# Patient Record
Sex: FEMALE | Race: WHITE | NOT HISPANIC OR LATINO | Employment: OTHER | URBAN - METROPOLITAN AREA
[De-identification: names, ages, dates, MRNs, and addresses within clinical notes are randomized per-mention and may not be internally consistent; named-entity substitution may affect disease eponyms.]

---

## 2020-02-12 ENCOUNTER — HOSPITAL ENCOUNTER (INPATIENT)
Facility: HOSPITAL | Age: 60
LOS: 2 days | Discharge: HOME | End: 2020-02-14
Attending: INTERNAL MEDICINE | Admitting: HOSPITALIST

## 2020-02-12 ENCOUNTER — APPOINTMENT (OUTPATIENT)
Dept: GENERAL RADIOLOGY | Facility: HOSPITAL | Age: 60
End: 2020-02-12

## 2020-02-12 ENCOUNTER — ANESTHESIA EVENT (OUTPATIENT)
Dept: PERIOP | Facility: HOSPITAL | Age: 60
End: 2020-02-12

## 2020-02-12 ENCOUNTER — ANESTHESIA (OUTPATIENT)
Dept: PERIOP | Facility: HOSPITAL | Age: 60
End: 2020-02-12

## 2020-02-12 DIAGNOSIS — N20.1 URETEROLITHIASIS: Primary | ICD-10-CM

## 2020-02-12 PROBLEM — A41.9 SEPSIS WITHOUT ACUTE ORGAN DYSFUNCTION (HCC): Status: ACTIVE | Noted: 2020-02-12

## 2020-02-12 PROBLEM — E87.1 HYPONATREMIA: Status: ACTIVE | Noted: 2020-02-12

## 2020-02-12 PROBLEM — N10 ACUTE PYELONEPHRITIS: Status: ACTIVE | Noted: 2020-02-12

## 2020-02-12 PROBLEM — E11.9 TYPE 2 DIABETES MELLITUS WITHOUT COMPLICATION: Status: ACTIVE | Noted: 2020-02-12

## 2020-02-12 PROBLEM — N12 PYELONEPHRITIS: Status: ACTIVE | Noted: 2020-02-12

## 2020-02-12 LAB
ANION GAP SERPL CALCULATED.3IONS-SCNC: 12.2 MMOL/L (ref 5–15)
BASOPHILS # BLD AUTO: 0.02 10*3/MM3 (ref 0–0.2)
BASOPHILS NFR BLD AUTO: 0.2 % (ref 0–1.5)
BUN BLD-MCNC: 19 MG/DL (ref 6–20)
BUN/CREAT SERPL: 21.1 (ref 7–25)
CALCIUM SPEC-SCNC: 8.5 MG/DL (ref 8.6–10.5)
CHLORIDE SERPL-SCNC: 103 MMOL/L (ref 98–107)
CO2 SERPL-SCNC: 20.8 MMOL/L (ref 22–29)
CREAT BLD-MCNC: 0.9 MG/DL (ref 0.57–1)
D-LACTATE SERPL-SCNC: 0.8 MMOL/L (ref 0.5–2)
DEPRECATED RDW RBC AUTO: 36 FL (ref 37–54)
EOSINOPHIL # BLD AUTO: 0.06 10*3/MM3 (ref 0–0.4)
EOSINOPHIL NFR BLD AUTO: 0.6 % (ref 0.3–6.2)
ERYTHROCYTE [DISTWIDTH] IN BLOOD BY AUTOMATED COUNT: 13 % (ref 12.3–15.4)
GFR SERPL CREATININE-BSD FRML MDRD: 64 ML/MIN/1.73
GLUCOSE BLD-MCNC: 154 MG/DL (ref 65–99)
HBA1C MFR BLD: 9.5 % (ref 4.8–5.6)
HCT VFR BLD AUTO: 32.9 % (ref 34–46.6)
HGB BLD-MCNC: 10.7 G/DL (ref 12–15.9)
IMM GRANULOCYTES # BLD AUTO: 0.04 10*3/MM3 (ref 0–0.05)
IMM GRANULOCYTES NFR BLD AUTO: 0.4 % (ref 0–0.5)
LYMPHOCYTES # BLD AUTO: 0.74 10*3/MM3 (ref 0.7–3.1)
LYMPHOCYTES NFR BLD AUTO: 7.8 % (ref 19.6–45.3)
MCH RBC QN AUTO: 25.2 PG (ref 26.6–33)
MCHC RBC AUTO-ENTMCNC: 32.5 G/DL (ref 31.5–35.7)
MCV RBC AUTO: 77.6 FL (ref 79–97)
MONOCYTES # BLD AUTO: 1.47 10*3/MM3 (ref 0.1–0.9)
MONOCYTES NFR BLD AUTO: 15.5 % (ref 5–12)
NEUTROPHILS # BLD AUTO: 7.17 10*3/MM3 (ref 1.7–7)
NEUTROPHILS NFR BLD AUTO: 75.5 % (ref 42.7–76)
NRBC BLD AUTO-RTO: 0 /100 WBC (ref 0–0.2)
PLATELET # BLD AUTO: 226 10*3/MM3 (ref 140–450)
PMV BLD AUTO: 10.4 FL (ref 6–12)
POTASSIUM BLD-SCNC: 4 MMOL/L (ref 3.5–5.2)
RBC # BLD AUTO: 4.24 10*6/MM3 (ref 3.77–5.28)
SODIUM BLD-SCNC: 136 MMOL/L (ref 136–145)
WBC NRBC COR # BLD: 9.5 10*3/MM3 (ref 3.4–10.8)

## 2020-02-12 PROCEDURE — 85025 COMPLETE CBC W/AUTO DIFF WBC: CPT | Performed by: NURSE PRACTITIONER

## 2020-02-12 PROCEDURE — 25010000002 KETOROLAC TROMETHAMINE PER 15 MG: Performed by: HOSPITALIST

## 2020-02-12 PROCEDURE — 25010000002 PROPOFOL 10 MG/ML EMULSION: Performed by: ANESTHESIOLOGY

## 2020-02-12 PROCEDURE — 76000 FLUOROSCOPY <1 HR PHYS/QHP: CPT

## 2020-02-12 PROCEDURE — 25010000002 ONDANSETRON PER 1 MG: Performed by: ANESTHESIOLOGY

## 2020-02-12 PROCEDURE — 25010000002 MIDAZOLAM PER 1 MG: Performed by: ANESTHESIOLOGY

## 2020-02-12 PROCEDURE — 25010000002 FENTANYL CITRATE (PF) 100 MCG/2ML SOLUTION: Performed by: ANESTHESIOLOGY

## 2020-02-12 PROCEDURE — 25010000002 MORPHINE PER 10 MG: Performed by: INTERNAL MEDICINE

## 2020-02-12 PROCEDURE — C2617 STENT, NON-COR, TEM W/O DEL: HCPCS | Performed by: UROLOGY

## 2020-02-12 PROCEDURE — 25010000002 CEFTRIAXONE PER 250 MG: Performed by: NURSE PRACTITIONER

## 2020-02-12 PROCEDURE — 83605 ASSAY OF LACTIC ACID: CPT | Performed by: HOSPITALIST

## 2020-02-12 PROCEDURE — 80048 BASIC METABOLIC PNL TOTAL CA: CPT | Performed by: NURSE PRACTITIONER

## 2020-02-12 PROCEDURE — 87040 BLOOD CULTURE FOR BACTERIA: CPT | Performed by: HOSPITALIST

## 2020-02-12 PROCEDURE — 83036 HEMOGLOBIN GLYCOSYLATED A1C: CPT | Performed by: NURSE PRACTITIONER

## 2020-02-12 PROCEDURE — 74018 RADEX ABDOMEN 1 VIEW: CPT

## 2020-02-12 PROCEDURE — 0T778DZ DILATION OF LEFT URETER WITH INTRALUMINAL DEVICE, VIA NATURAL OR ARTIFICIAL OPENING ENDOSCOPIC: ICD-10-PCS | Performed by: UROLOGY

## 2020-02-12 PROCEDURE — C1769 GUIDE WIRE: HCPCS | Performed by: UROLOGY

## 2020-02-12 PROCEDURE — 25010000002 ONDANSETRON PER 1 MG: Performed by: NURSE PRACTITIONER

## 2020-02-12 DEVICE — URETERAL STENT
Type: IMPLANTABLE DEVICE | Site: URETER | Status: NON-FUNCTIONAL
Brand: CONTOUR™
Removed: 2020-02-25

## 2020-02-12 RX ORDER — POLYETHYLENE GLYCOL 3350 17 G/17G
17 POWDER, FOR SOLUTION ORAL DAILY
Status: DISCONTINUED | OUTPATIENT
Start: 2020-02-12 | End: 2020-02-14 | Stop reason: HOSPADM

## 2020-02-12 RX ORDER — LIDOCAINE HYDROCHLORIDE 20 MG/ML
INJECTION, SOLUTION INFILTRATION; PERINEURAL AS NEEDED
Status: DISCONTINUED | OUTPATIENT
Start: 2020-02-12 | End: 2020-02-12 | Stop reason: SURG

## 2020-02-12 RX ORDER — CEFTRIAXONE SODIUM 1 G/50ML
1 INJECTION, SOLUTION INTRAVENOUS EVERY 24 HOURS
Status: DISCONTINUED | OUTPATIENT
Start: 2020-02-12 | End: 2020-02-12

## 2020-02-12 RX ORDER — HYDRALAZINE HYDROCHLORIDE 20 MG/ML
5 INJECTION INTRAMUSCULAR; INTRAVENOUS
Status: DISCONTINUED | OUTPATIENT
Start: 2020-02-12 | End: 2020-02-12 | Stop reason: HOSPADM

## 2020-02-12 RX ORDER — DIPHENHYDRAMINE HYDROCHLORIDE 50 MG/ML
12.5 INJECTION INTRAMUSCULAR; INTRAVENOUS
Status: DISCONTINUED | OUTPATIENT
Start: 2020-02-12 | End: 2020-02-12 | Stop reason: HOSPADM

## 2020-02-12 RX ORDER — ONDANSETRON 2 MG/ML
4 INJECTION INTRAMUSCULAR; INTRAVENOUS EVERY 6 HOURS PRN
Status: DISCONTINUED | OUTPATIENT
Start: 2020-02-12 | End: 2020-02-14 | Stop reason: HOSPADM

## 2020-02-12 RX ORDER — SODIUM CHLORIDE 9 MG/ML
150 INJECTION, SOLUTION INTRAVENOUS CONTINUOUS
Status: DISCONTINUED | OUTPATIENT
Start: 2020-02-12 | End: 2020-02-12

## 2020-02-12 RX ORDER — HYDROCODONE BITARTRATE AND ACETAMINOPHEN 7.5; 325 MG/1; MG/1
1 TABLET ORAL EVERY 6 HOURS PRN
Status: DISCONTINUED | OUTPATIENT
Start: 2020-02-12 | End: 2020-02-14 | Stop reason: HOSPADM

## 2020-02-12 RX ORDER — PROMETHAZINE HYDROCHLORIDE 25 MG/1
25 SUPPOSITORY RECTAL ONCE AS NEEDED
Status: DISCONTINUED | OUTPATIENT
Start: 2020-02-12 | End: 2020-02-12 | Stop reason: HOSPADM

## 2020-02-12 RX ORDER — HYDROMORPHONE HYDROCHLORIDE 1 MG/ML
0.5 INJECTION, SOLUTION INTRAMUSCULAR; INTRAVENOUS; SUBCUTANEOUS
Status: DISCONTINUED | OUTPATIENT
Start: 2020-02-12 | End: 2020-02-12 | Stop reason: HOSPADM

## 2020-02-12 RX ORDER — MIDAZOLAM HYDROCHLORIDE 1 MG/ML
2 INJECTION INTRAMUSCULAR; INTRAVENOUS
Status: DISCONTINUED | OUTPATIENT
Start: 2020-02-12 | End: 2020-02-12 | Stop reason: HOSPADM

## 2020-02-12 RX ORDER — NALOXONE HCL 0.4 MG/ML
0.2 VIAL (ML) INJECTION AS NEEDED
Status: DISCONTINUED | OUTPATIENT
Start: 2020-02-12 | End: 2020-02-12 | Stop reason: HOSPADM

## 2020-02-12 RX ORDER — SODIUM CHLORIDE 0.9 % (FLUSH) 0.9 %
10 SYRINGE (ML) INJECTION EVERY 12 HOURS SCHEDULED
Status: DISCONTINUED | OUTPATIENT
Start: 2020-02-12 | End: 2020-02-13

## 2020-02-12 RX ORDER — CEFTRIAXONE SODIUM 2 G/50ML
2 INJECTION, SOLUTION INTRAVENOUS EVERY 24 HOURS
Status: DISCONTINUED | OUTPATIENT
Start: 2020-02-13 | End: 2020-02-14

## 2020-02-12 RX ORDER — KETOROLAC TROMETHAMINE 30 MG/ML
30 INJECTION, SOLUTION INTRAMUSCULAR; INTRAVENOUS EVERY 6 HOURS PRN
Status: DISCONTINUED | OUTPATIENT
Start: 2020-02-12 | End: 2020-02-13

## 2020-02-12 RX ORDER — DIPHENHYDRAMINE HCL 25 MG
25 CAPSULE ORAL
Status: DISCONTINUED | OUTPATIENT
Start: 2020-02-12 | End: 2020-02-12 | Stop reason: HOSPADM

## 2020-02-12 RX ORDER — EPHEDRINE SULFATE 50 MG/ML
5 INJECTION, SOLUTION INTRAVENOUS ONCE AS NEEDED
Status: DISCONTINUED | OUTPATIENT
Start: 2020-02-12 | End: 2020-02-12 | Stop reason: HOSPADM

## 2020-02-12 RX ORDER — FENTANYL CITRATE 50 UG/ML
50 INJECTION, SOLUTION INTRAMUSCULAR; INTRAVENOUS
Status: DISCONTINUED | OUTPATIENT
Start: 2020-02-12 | End: 2020-02-12 | Stop reason: HOSPADM

## 2020-02-12 RX ORDER — PROPOFOL 10 MG/ML
VIAL (ML) INTRAVENOUS AS NEEDED
Status: DISCONTINUED | OUTPATIENT
Start: 2020-02-12 | End: 2020-02-12 | Stop reason: SURG

## 2020-02-12 RX ORDER — MIDAZOLAM HYDROCHLORIDE 1 MG/ML
1 INJECTION INTRAMUSCULAR; INTRAVENOUS
Status: DISCONTINUED | OUTPATIENT
Start: 2020-02-12 | End: 2020-02-12 | Stop reason: HOSPADM

## 2020-02-12 RX ORDER — MAGNESIUM HYDROXIDE 1200 MG/15ML
LIQUID ORAL AS NEEDED
Status: DISCONTINUED | OUTPATIENT
Start: 2020-02-12 | End: 2020-02-12 | Stop reason: HOSPADM

## 2020-02-12 RX ORDER — PROMETHAZINE HYDROCHLORIDE 25 MG/ML
12.5 INJECTION, SOLUTION INTRAMUSCULAR; INTRAVENOUS ONCE AS NEEDED
Status: DISCONTINUED | OUTPATIENT
Start: 2020-02-12 | End: 2020-02-12 | Stop reason: HOSPADM

## 2020-02-12 RX ORDER — SODIUM CHLORIDE 0.9 % (FLUSH) 0.9 %
3 SYRINGE (ML) INJECTION EVERY 12 HOURS SCHEDULED
Status: DISCONTINUED | OUTPATIENT
Start: 2020-02-12 | End: 2020-02-12 | Stop reason: HOSPADM

## 2020-02-12 RX ORDER — ACETAMINOPHEN 325 MG/1
650 TABLET ORAL ONCE AS NEEDED
Status: DISCONTINUED | OUTPATIENT
Start: 2020-02-12 | End: 2020-02-12 | Stop reason: HOSPADM

## 2020-02-12 RX ORDER — HYDROCODONE BITARTRATE AND ACETAMINOPHEN 7.5; 325 MG/1; MG/1
1 TABLET ORAL ONCE AS NEEDED
Status: DISCONTINUED | OUTPATIENT
Start: 2020-02-12 | End: 2020-02-12 | Stop reason: HOSPADM

## 2020-02-12 RX ORDER — FAMOTIDINE 10 MG/ML
20 INJECTION, SOLUTION INTRAVENOUS ONCE
Status: COMPLETED | OUTPATIENT
Start: 2020-02-12 | End: 2020-02-12

## 2020-02-12 RX ORDER — SODIUM CHLORIDE 0.9 % (FLUSH) 0.9 %
10 SYRINGE (ML) INJECTION AS NEEDED
Status: DISCONTINUED | OUTPATIENT
Start: 2020-02-12 | End: 2020-02-13

## 2020-02-12 RX ORDER — PROMETHAZINE HYDROCHLORIDE 25 MG/1
25 TABLET ORAL ONCE AS NEEDED
Status: DISCONTINUED | OUTPATIENT
Start: 2020-02-12 | End: 2020-02-12 | Stop reason: HOSPADM

## 2020-02-12 RX ORDER — OXYCODONE AND ACETAMINOPHEN 7.5; 325 MG/1; MG/1
1 TABLET ORAL ONCE AS NEEDED
Status: DISCONTINUED | OUTPATIENT
Start: 2020-02-12 | End: 2020-02-12 | Stop reason: HOSPADM

## 2020-02-12 RX ORDER — ONDANSETRON 2 MG/ML
INJECTION INTRAMUSCULAR; INTRAVENOUS AS NEEDED
Status: DISCONTINUED | OUTPATIENT
Start: 2020-02-12 | End: 2020-02-12 | Stop reason: SURG

## 2020-02-12 RX ORDER — PROMETHAZINE HYDROCHLORIDE 25 MG/ML
6.25 INJECTION, SOLUTION INTRAMUSCULAR; INTRAVENOUS
Status: DISCONTINUED | OUTPATIENT
Start: 2020-02-12 | End: 2020-02-12 | Stop reason: HOSPADM

## 2020-02-12 RX ORDER — ONDANSETRON 2 MG/ML
4 INJECTION INTRAMUSCULAR; INTRAVENOUS ONCE AS NEEDED
Status: DISCONTINUED | OUTPATIENT
Start: 2020-02-12 | End: 2020-02-12 | Stop reason: HOSPADM

## 2020-02-12 RX ORDER — LABETALOL HYDROCHLORIDE 5 MG/ML
5 INJECTION, SOLUTION INTRAVENOUS
Status: DISCONTINUED | OUTPATIENT
Start: 2020-02-12 | End: 2020-02-12 | Stop reason: HOSPADM

## 2020-02-12 RX ORDER — LIDOCAINE HYDROCHLORIDE 10 MG/ML
0.5 INJECTION, SOLUTION EPIDURAL; INFILTRATION; INTRACAUDAL; PERINEURAL ONCE AS NEEDED
Status: DISCONTINUED | OUTPATIENT
Start: 2020-02-12 | End: 2020-02-12 | Stop reason: HOSPADM

## 2020-02-12 RX ORDER — SODIUM CHLORIDE 0.9 % (FLUSH) 0.9 %
3-10 SYRINGE (ML) INJECTION AS NEEDED
Status: DISCONTINUED | OUTPATIENT
Start: 2020-02-12 | End: 2020-02-12 | Stop reason: HOSPADM

## 2020-02-12 RX ORDER — ACETAMINOPHEN 325 MG/1
650 TABLET ORAL EVERY 4 HOURS PRN
Status: DISCONTINUED | OUTPATIENT
Start: 2020-02-12 | End: 2020-02-14 | Stop reason: HOSPADM

## 2020-02-12 RX ORDER — FLUMAZENIL 0.1 MG/ML
0.2 INJECTION INTRAVENOUS AS NEEDED
Status: DISCONTINUED | OUTPATIENT
Start: 2020-02-12 | End: 2020-02-12 | Stop reason: HOSPADM

## 2020-02-12 RX ORDER — SODIUM CHLORIDE, SODIUM LACTATE, POTASSIUM CHLORIDE, CALCIUM CHLORIDE 600; 310; 30; 20 MG/100ML; MG/100ML; MG/100ML; MG/100ML
9 INJECTION, SOLUTION INTRAVENOUS CONTINUOUS
Status: DISCONTINUED | OUTPATIENT
Start: 2020-02-12 | End: 2020-02-13

## 2020-02-12 RX ORDER — ACETAMINOPHEN 650 MG/1
650 SUPPOSITORY RECTAL EVERY 4 HOURS PRN
Status: DISCONTINUED | OUTPATIENT
Start: 2020-02-12 | End: 2020-02-12

## 2020-02-12 RX ORDER — MORPHINE SULFATE 2 MG/ML
2 INJECTION, SOLUTION INTRAMUSCULAR; INTRAVENOUS
Status: DISCONTINUED | OUTPATIENT
Start: 2020-02-12 | End: 2020-02-12

## 2020-02-12 RX ORDER — SODIUM CHLORIDE 9 MG/ML
150 INJECTION, SOLUTION INTRAVENOUS CONTINUOUS
Status: DISCONTINUED | OUTPATIENT
Start: 2020-02-12 | End: 2020-02-13

## 2020-02-12 RX ORDER — ACETAMINOPHEN 160 MG/5ML
650 SOLUTION ORAL EVERY 4 HOURS PRN
Status: DISCONTINUED | OUTPATIENT
Start: 2020-02-12 | End: 2020-02-12

## 2020-02-12 RX ADMIN — MORPHINE SULFATE 2 MG: 2 INJECTION, SOLUTION INTRAMUSCULAR; INTRAVENOUS at 02:24

## 2020-02-12 RX ADMIN — SODIUM CHLORIDE 150 ML/HR: 9 INJECTION, SOLUTION INTRAVENOUS at 20:03

## 2020-02-12 RX ADMIN — PROPOFOL 200 MG: 10 INJECTION, EMULSION INTRAVENOUS at 16:32

## 2020-02-12 RX ADMIN — SODIUM CHLORIDE 100 ML/HR: 9 INJECTION, SOLUTION INTRAVENOUS at 02:40

## 2020-02-12 RX ADMIN — SODIUM CHLORIDE, PRESERVATIVE FREE 10 ML: 5 INJECTION INTRAVENOUS at 20:03

## 2020-02-12 RX ADMIN — MORPHINE SULFATE 2 MG: 2 INJECTION, SOLUTION INTRAMUSCULAR; INTRAVENOUS at 06:47

## 2020-02-12 RX ADMIN — ONDANSETRON 4 MG: 2 INJECTION INTRAMUSCULAR; INTRAVENOUS at 02:27

## 2020-02-12 RX ADMIN — FENTANYL CITRATE 50 MCG: 50 INJECTION INTRAMUSCULAR; INTRAVENOUS at 16:10

## 2020-02-12 RX ADMIN — FAMOTIDINE 20 MG: 10 INJECTION INTRAVENOUS at 15:52

## 2020-02-12 RX ADMIN — SODIUM CHLORIDE, POTASSIUM CHLORIDE, SODIUM LACTATE AND CALCIUM CHLORIDE: 600; 310; 30; 20 INJECTION, SOLUTION INTRAVENOUS at 16:27

## 2020-02-12 RX ADMIN — MIDAZOLAM 1 MG: 1 INJECTION INTRAMUSCULAR; INTRAVENOUS at 16:09

## 2020-02-12 RX ADMIN — MIDAZOLAM 1 MG: 1 INJECTION INTRAMUSCULAR; INTRAVENOUS at 15:52

## 2020-02-12 RX ADMIN — KETOROLAC TROMETHAMINE 30 MG: 30 INJECTION, SOLUTION INTRAMUSCULAR at 11:43

## 2020-02-12 RX ADMIN — FENTANYL CITRATE 50 MCG: 50 INJECTION INTRAMUSCULAR; INTRAVENOUS at 15:52

## 2020-02-12 RX ADMIN — HYDROCODONE BITARTRATE AND ACETAMINOPHEN 1 TABLET: 7.5; 325 TABLET ORAL at 20:12

## 2020-02-12 RX ADMIN — ONDANSETRON HYDROCHLORIDE 4 MG: 2 SOLUTION INTRAMUSCULAR; INTRAVENOUS at 16:44

## 2020-02-12 RX ADMIN — ACETAMINOPHEN 650 MG: 325 TABLET, FILM COATED ORAL at 05:24

## 2020-02-12 RX ADMIN — CEFTRIAXONE SODIUM 1 G: 1 INJECTION, SOLUTION INTRAVENOUS at 03:46

## 2020-02-12 RX ADMIN — LIDOCAINE HYDROCHLORIDE 100 MG: 20 INJECTION, SOLUTION INFILTRATION; PERINEURAL at 16:31

## 2020-02-12 NOTE — PLAN OF CARE
Arrived to unit at 0130. Oriented to room, staff, schedule. Alert and oriented. Pleasant cooperative. Medicated with morphine x1 for c/o low abd pain with relief voiced. Medicated with Zofran x1 for c/o nausea, no vomiting, with good results. Temp elevated 101.5, tylenol given. IVF. IV abx. NPO. Urology consult ordered. Will monitor

## 2020-02-12 NOTE — ANESTHESIA PREPROCEDURE EVALUATION
Anesthesia Evaluation     NPO Solid Status: > 8 hours  NPO Liquid Status: > 8 hours           Airway   Mallampati: II  TM distance: >3 FB  Neck ROM: full  No difficulty expected  Dental    (+) implants    Pulmonary - normal exam   (+) a smoker Former,   Cardiovascular - normal exam        Neuro/Psych  GI/Hepatic/Renal/Endo    (+)   renal disease stones, diabetes mellitus type 2,     Musculoskeletal     Abdominal    Substance History      OB/GYN          Other                        Anesthesia Plan    ASA 3     general     intravenous induction     Anesthetic plan, all risks, benefits, and alternatives have been provided, discussed and informed consent has been obtained with: patient.

## 2020-02-12 NOTE — ANESTHESIA PROCEDURE NOTES
Airway  Urgency: elective    Date/Time: 2/12/2020 4:36 PM  Airway not difficult    General Information and Staff    Patient location during procedure: OR  Anesthesiologist: Missael Miller MD    Indications and Patient Condition  Indications for airway management: airway protection    Preoxygenated: yes  Mask difficulty assessment: 1 - vent by mask    Final Airway Details  Final airway type: supraglottic airway      Successful airway: LMA  Size 4    Number of attempts at approach: 1  Assessment: lips, teeth, and gum same as pre-op

## 2020-02-12 NOTE — PLAN OF CARE
Patient c/o pain and states toradol not helping. MD notified, no new orders. Gone to OR for stent placement, awaiting return to unit. Will monitor upon arrival.

## 2020-02-12 NOTE — SIGNIFICANT NOTE
Spoke with  and informed him that the pt has done well in recovery and she is still very sleepy.  She is ready to go back to her room P490 and he could go up to the room

## 2020-02-12 NOTE — CONSULTS
FIRST UROLOGY CONSULT      Patient Identification:  NAME:  Sherita Parada  Age:  60 y.o.   Sex:  female   :  1960   MRN:  [id number]       Chief complaint: L flank pain.    History of present illness:  H/p L flank pain , dull x 2 days. Fever. Ct scan at [facility] 4mm L ureteral stone, pyelo. Pain not controlled.        Past medical history:  No past medical history on file.    Past surgical history:  Past Surgical History:   Procedure Laterality Date   • APPENDECTOMY     • BACK SURGERY     • KNEE SURGERY         Allergies:  Patient has no known allergies.    Home medications:  No medications prior to admission.        Hospital medications:    [START ON 2020] cefTRIAXone 2 g Intravenous Q24H   polyethylene glycol 17 g Oral Daily   sodium chloride 10 mL Intravenous Q12H       sodium chloride 150 mL/hr Last Rate: 150 mL/hr (20 0759)     •  acetaminophen **OR** [DISCONTINUED] acetaminophen **OR** [DISCONTINUED] acetaminophen  •  ketorolac  •  ondansetron  •  sodium chloride    Family history:  Family History   Problem Relation Age of Onset   • Diabetes Mother    • Diabetes Father    • Heart disease Father    • COPD Father        Social history:  Social History     Tobacco Use   • Smoking status: Former Smoker     Packs/day: 1.50     Years: 30.00     Pack years: 45.00     Types: Cigarettes     Last attempt to quit: 2008     Years since quittin.0   • Smokeless tobacco: Never Used   Substance Use Topics   • Alcohol use: Never     Frequency: Never   • Drug use: Never       Review of systems:      Positive for:  Flank pain.    Negative for:  Confusion.      Objective:  TMax 24 hours:   Temp (24hrs), Av.8 °F (37.1 °C), Min:97.1 °F (36.2 °C), Max:101.5 °F (38.6 °C)      Vitals Ranges:   Temp:  [97.1 °F (36.2 °C)-101.5 °F (38.6 °C)] 97.1 °F (36.2 °C)  Heart Rate:  [] 80  Resp:  [16] 16  BP: (101-135)/(63-77) 101/63    Intake/Output Last 3 shifts:  I/O last 3 completed shifts:  In: 0   Out:  200 [Urine:200]     Physical Exam:    General Appearance:    Alert, cooperative, NAD   HEENT:    No trauma, pupils reactive, hearing intact   Back:     No CVA tenderness   Lungs:     Respirations unlabored, no wheezing    Heart:    RRR.   Abdomen:     Soft, NDNT, no masses, no guarding   :    Pelvic not performed, bladder non distended and non tender   Extremities:   No edema, no deformity   Lymphatic:     Skin:   No bleeding, bruising or rashes   Neuro/Psych:   Orientation intact, mood/affect pleasant, no focal findings       Results review:   I reviewed the patient's new clinical results.    Data review:  Lab Results (last 24 hours)     Procedure Component Value Units Date/Time    Basic Metabolic Panel [[id number]]  (Abnormal) Collected:  02/12/20 0728    Specimen:  Blood from Arm, Right Updated:  02/12/20 0812     Glucose 154 mg/dL      BUN 19 mg/dL      Creatinine 0.90 mg/dL      Sodium 136 mmol/L      Potassium 4.0 mmol/L      Chloride 103 mmol/L      CO2 20.8 mmol/L      Calcium 8.5 mg/dL      eGFR Non African Amer 64 mL/min/1.73      BUN/Creatinine Ratio 21.1     Anion Gap 12.2 mmol/L     Narrative:       GFR Normal >60  Chronic Kidney Disease <60  Kidney Failure <15      Hemoglobin A1c [[id number]]  (Abnormal) Collected:  02/12/20 0728    Specimen:  Blood from Arm, Right Updated:  02/12/20 0800     Hemoglobin A1C 9.50 %     Narrative:       Hemoglobin A1C Ranges:    Increased Risk for Diabetes  5.7% to 6.4%  Diabetes                     >= 6.5%  Diabetic Goal                < 7.0%    Lactic Acid, Plasma [[id number]]  (Normal) Collected:  02/12/20 0728    Specimen:  Blood from Arm, Right Updated:  02/12/20 0759     Lactate 0.8 mmol/L     CBC Auto Differential [[id number]]  (Abnormal) Collected:  02/12/20 0728    Specimen:  Blood from Arm, Right Updated:  02/12/20 0754     WBC 9.50 10*3/mm3      RBC 4.24 10*6/mm3      Hemoglobin 10.7 g/dL      Hematocrit 32.9 %      MCV 77.6 fL      MCH 25.2 pg      MCHC 32.5  g/dL      RDW 13.0 %      RDW-SD 36.0 fl      MPV 10.4 fL      Platelets 226 10*3/mm3      Neutrophil % 75.5 %      Lymphocyte % 7.8 %      Monocyte % 15.5 %      Eosinophil % 0.6 %      Basophil % 0.2 %      Immature Grans % 0.4 %      Neutrophils, Absolute 7.17 10*3/mm3      Lymphocytes, Absolute 0.74 10*3/mm3      Monocytes, Absolute 1.47 10*3/mm3      Eosinophils, Absolute 0.06 10*3/mm3      Basophils, Absolute 0.02 10*3/mm3      Immature Grans, Absolute 0.04 10*3/mm3      nRBC 0.0 /100 WBC     Blood Culture - Blood, Arm, Right [[id number]] Collected:  02/12/20 0728    Specimen:  Blood from Arm, Right Updated:  02/12/20 0742    Blood Culture - Blood, Hand, Right [[id number]] Collected:  02/12/20 0736    Specimen:  Blood from Hand, Right Updated:  02/12/20 0742           Imaging:  Imaging Results (Last 24 Hours)     ** No results found for the last 24 hours. **             Assessment:       Acute pyelonephritis    Ureterolithiasis    Type 2 diabetes mellitus without complication (CMS/HCC)    Sepsis without acute organ dysfunction (CMS/HCC)    Hyponatremia    L ureteral stone.  Pyelo.      Plan:     Plan cysto L stent placement.  R/NB d/w pt.      Chemo Ramsey MD  02/12/20  12:39 PM

## 2020-02-12 NOTE — H&P
Patient Name:  Sherita Parada  YOB: 1960  MRN:  [id number]  Admit Date:  2/12/2020  Patient Care Team:  Placido Ruiz MD as PCP - General (Family Medicine)      Subjective   History Present Illness     Chief complaint: Left flank and abdominal pain.    History of Present Illness     Mrs. Parada is a 60-year-old female with no real significant history other than  diabetes mellitus but she is not getting treatment for who presented to  emergency room for left flank pain and abdominal pain.  She states that she has not been feeling good for about a week, some off-and-on abdominal pain, but the abdominal pain and flank pain became severe on 2/11/2020 and is why she came to the emergency room.  She denies any fever or chills at home.  She denies any trouble urinating, although she states she did see some pink-tinged urine earlier in the day and she is had some persistent nausea and vomiting.  She denies any chest pain or shortness of breath or diarrhea.  She does complain of some constipation, has not had a bowel movement in about 5 days.  She does have history of kidney stones she states.  Patient had a CT abdomen and pelvis at  emergency room prior to transfer here to [facility],  which showed marked left perinephric and periureteral stranding with urothelial thickening concerning for pyelonephritis.  Marked left hydronephrosis and moderate hydroureter.  2 ureteral calculus present 1 measuring up to 4 mm which is positioned at approximately L4-5 intervertebral disc level.  Lab work from previous facility showed a sodium level 131, potassium 4.0, creatinine 1.15, GFR 48, calcium 9.1, white blood cell 13.9, hemoglobin 13.3.  Urine results positive for leukoesterase, nitrites, protein, ketones, bilirubin, blood, white blood cells, bacteria.  Culture is pending amiodarone appears.  Lactic was negative.  Patient appears in no acute distress.  She is resting well during my exam, pain is controlled  "at this time she does have some mild nausea.  Patient denies any significant history other than being told she has diabetes, but she is not taking medicine for that.  She did states she stopped  \"all\" her medicine because she was on too much, but she states she does not have any heart history, no lung history, no other significant history.  She was unsure of any other medications that she took, just that she is not taking any now.    Review of Systems   Constitutional: Positive for appetite change (decreased). Negative for fever.   HENT: Negative for nosebleeds and trouble swallowing.    Eyes: Negative for photophobia, redness and visual disturbance.   Respiratory: Negative for cough, chest tightness, shortness of breath and wheezing.    Cardiovascular: Negative for chest pain, palpitations and leg swelling.   Gastrointestinal: Positive for abdominal pain. Negative for abdominal distention, nausea and vomiting.   Endocrine: Negative.    Genitourinary: Positive for flank pain and hematuria. Negative for dysuria.   Musculoskeletal: Negative for gait problem and joint swelling.   Skin: Negative.    Neurological: Negative for dizziness, seizures, speech difficulty, light-headedness and headaches.   Hematological: Negative.    Psychiatric/Behavioral: Negative for behavioral problems and confusion.        Personal History     No past medical history on file.  Past Surgical History:   Procedure Laterality Date   • APPENDECTOMY     • BACK SURGERY     • KNEE SURGERY       Family History   Problem Relation Age of Onset   • Diabetes Mother    • Diabetes Father    • Heart disease Father    • COPD Father      Social History     Tobacco Use   • Smoking status: Former Smoker     Packs/day: 1.50     Years: 30.00     Pack years: 45.00     Types: Cigarettes     Last attempt to quit: 2008     Years since quittin.0   • Smokeless tobacco: Never Used   Substance Use Topics   • Alcohol use: Never     Frequency: Never   • Drug " use: Never     No current facility-administered medications on file prior to encounter.      No current outpatient medications on file prior to encounter.     No Known Allergies    Objective    Objective     Vital Signs  Temp:  [97.8 °F (36.6 °C)] 97.8 °F (36.6 °C)  Heart Rate:  [99] 99  Resp:  [16] 16  BP: (127)/(77) 127/77  SpO2:  [95 %] 95 %  on   ;   Device (Oxygen Therapy): room air  Body mass index is 24.59 kg/m².    Physical Exam   Constitutional: She is oriented to person, place, and time. She appears well-developed and well-nourished. No distress.   HENT:   Head: Normocephalic.   Eyes: EOM are normal.   Neck: Normal range of motion. No JVD present.   Cardiovascular: Normal rate and regular rhythm.   Pulmonary/Chest: Effort normal and breath sounds normal.   Abdominal: Soft. She exhibits no distension. There is tenderness in the suprapubic area and left lower quadrant. There is CVA tenderness (left).   Musculoskeletal: Normal range of motion.   Neurological: She is alert and oriented to person, place, and time. She has normal strength.   Skin: Skin is warm and dry. Capillary refill takes less than 2 seconds.   Psychiatric: Her behavior is normal.   Nursing note and vitals reviewed.      Results Review:  I reviewed the patient's new clinical results.  I reviewed the patient's new imaging results and agree with the interpretation.  I reviewed the patient's other test results and agree with the interpretation  I personally viewed and interpreted the patient's EKG/Telemetry data  Discussed with ED provider.    Lab Results (last 24 hours)     ** No results found for the last 24 hours. **          Imaging Results (Last 24 Hours)     ** No results found for the last 24 hours. **               No orders to display        Assessment/Plan     Active Hospital Problems    Diagnosis POA   • Pyelonephritis [N12] Unknown   • Ureterolithiasis [N20.1] Unknown   • Type 2 diabetes mellitus without complication (CMS/[facility])  [E11.9] Unknown     Mrs. Parada is a 60-year-old female with no real significant history other than some diabetes mellitus but she is not getting treatment for who presented to you emergency room for left flank pain and abdominal pain.      Pyelonephritis/ureterolithiasis  -Consult urology  -Patient given 1 g of Rocephin in the ED the emergency room, will continue 1 g Rocephin every 24 hours.  -Patient states she does have history of kidney stones, has seen Dr. Ramsey in the past.  -Pain and nausea control overnight  -IV fluids, n.p.o. until seen by urology    Type 2 diabetes  -Patient states she was told she has diabetes, but she is not taking any medication for that, she has not had A1c checked recently.    Patient states she is on no home meds because she stopped taking everything because she was on too many meds.  She denies any significant history other than being told she has diabetes.     I discussed the patient's findings and my recommendations with patient, nursing staff and ED provider.    VTE Prophylaxis - SCDs.  Code Status - Full code.       BALDO Randall  [facility]  02/12/20  3:08 AM

## 2020-02-12 NOTE — OP NOTE
PREOPERATIVE DIAGNOSIS: Left ureteral stone, pyelonephritis.    POSTOPERATIVE DIAGNOSIS: Same    PROCEDURE: Cystoscopy left ureteral stent placement.    SURGEON:  Chemo Ramsey MD    ASSISTANT: None    ANESTHESIA: General    EBL: None    DRAINS: 6 Korean by 26 cm double-J ureteral stent.    COMPLICATIONS: None    FINDINGS: Left ureteral stone.    INDICATIONS FOR PROCEDURE: History of a 4 mm obstructing left ureteral stone with pyelonephritis.  Patient presents today for cystoscopy left stent placement.  Risk and benefits were explained include but not limited to bleeding, infection, damage to kidney and ureter.  Patient consented to the procedure.    DESCRIPTION OF PROCEDURE: After receiving antibiotics was taken to the cystoscopy suite underwent a general anesthesia.  After adequate anesthesia was obtained she is placed in dorsal lithotomy position.  Her groins prepped and draped in sterile fashion.  A 21 Korean cystoscope was introduced into the urethra and into the bladder the urethra was in normal limits once into the bladder the ureteral orifice ease were noted bilaterally no masses or stones were noted.  I placed an 035 Bentson guidewire into the left orifice and into the left renal pelvis which was confirmed by fluoroscopy.  I then placed a 6 Korean by 26 cm double-J ureteral stent over the guidewire and Seldinger technique.  There was good coil noted in the left renal pelvis which was confirmed by fluoroscopy and good coil noted in the bladder confirmed by cystoscopy.  Bladder was drained string was removed cystoscope was removed.  She was extubated taken recovery in satisfactory condition.  She tolerated the procedure well.

## 2020-02-13 LAB
ALBUMIN SERPL-MCNC: 2.6 G/DL (ref 3.5–5.2)
ALBUMIN/GLOB SERPL: 0.7 G/DL
ALP SERPL-CCNC: 77 U/L (ref 39–117)
ALT SERPL W P-5'-P-CCNC: 7 U/L (ref 1–33)
ANION GAP SERPL CALCULATED.3IONS-SCNC: 13 MMOL/L (ref 5–15)
AST SERPL-CCNC: 10 U/L (ref 1–32)
BASOPHILS # BLD AUTO: 0.04 10*3/MM3 (ref 0–0.2)
BASOPHILS NFR BLD AUTO: 0.5 % (ref 0–1.5)
BILIRUB SERPL-MCNC: 0.3 MG/DL (ref 0.2–1.2)
BUN BLD-MCNC: 14 MG/DL (ref 6–20)
BUN/CREAT SERPL: 20.9 (ref 7–25)
CALCIUM SPEC-SCNC: 8.2 MG/DL (ref 8.6–10.5)
CHLORIDE SERPL-SCNC: 103 MMOL/L (ref 98–107)
CO2 SERPL-SCNC: 21 MMOL/L (ref 22–29)
CREAT BLD-MCNC: 0.67 MG/DL (ref 0.57–1)
CRP SERPL-MCNC: 19.07 MG/DL (ref 0–0.5)
D-LACTATE SERPL-SCNC: 0.9 MMOL/L (ref 0.5–2)
DEPRECATED RDW RBC AUTO: 37 FL (ref 37–54)
EOSINOPHIL # BLD AUTO: 0.25 10*3/MM3 (ref 0–0.4)
EOSINOPHIL NFR BLD AUTO: 3.3 % (ref 0.3–6.2)
ERYTHROCYTE [DISTWIDTH] IN BLOOD BY AUTOMATED COUNT: 13.1 % (ref 12.3–15.4)
GFR SERPL CREATININE-BSD FRML MDRD: 90 ML/MIN/1.73
GLOBULIN UR ELPH-MCNC: 3.8 GM/DL
GLUCOSE BLD-MCNC: 120 MG/DL (ref 65–99)
HCT VFR BLD AUTO: 31.6 % (ref 34–46.6)
HGB BLD-MCNC: 10.6 G/DL (ref 12–15.9)
IMM GRANULOCYTES # BLD AUTO: 0.02 10*3/MM3 (ref 0–0.05)
IMM GRANULOCYTES NFR BLD AUTO: 0.3 % (ref 0–0.5)
LYMPHOCYTES # BLD AUTO: 1.24 10*3/MM3 (ref 0.7–3.1)
LYMPHOCYTES NFR BLD AUTO: 16.4 % (ref 19.6–45.3)
MAGNESIUM SERPL-MCNC: 1.7 MG/DL (ref 1.6–2.6)
MCH RBC QN AUTO: 26.2 PG (ref 26.6–33)
MCHC RBC AUTO-ENTMCNC: 33.5 G/DL (ref 31.5–35.7)
MCV RBC AUTO: 78 FL (ref 79–97)
MONOCYTES # BLD AUTO: 0.9 10*3/MM3 (ref 0.1–0.9)
MONOCYTES NFR BLD AUTO: 11.9 % (ref 5–12)
NEUTROPHILS # BLD AUTO: 5.11 10*3/MM3 (ref 1.7–7)
NEUTROPHILS NFR BLD AUTO: 67.6 % (ref 42.7–76)
NRBC BLD AUTO-RTO: 0 /100 WBC (ref 0–0.2)
PLATELET # BLD AUTO: 216 10*3/MM3 (ref 140–450)
PMV BLD AUTO: 10.6 FL (ref 6–12)
POTASSIUM BLD-SCNC: 4 MMOL/L (ref 3.5–5.2)
PROCALCITONIN SERPL-MCNC: 0.49 NG/ML (ref 0.1–0.25)
PROT SERPL-MCNC: 6.4 G/DL (ref 6–8.5)
RBC # BLD AUTO: 4.05 10*6/MM3 (ref 3.77–5.28)
SODIUM BLD-SCNC: 137 MMOL/L (ref 136–145)
WBC NRBC COR # BLD: 7.56 10*3/MM3 (ref 3.4–10.8)

## 2020-02-13 PROCEDURE — 83605 ASSAY OF LACTIC ACID: CPT | Performed by: UROLOGY

## 2020-02-13 PROCEDURE — 86140 C-REACTIVE PROTEIN: CPT | Performed by: UROLOGY

## 2020-02-13 PROCEDURE — 85025 COMPLETE CBC W/AUTO DIFF WBC: CPT | Performed by: UROLOGY

## 2020-02-13 PROCEDURE — 84145 PROCALCITONIN (PCT): CPT | Performed by: UROLOGY

## 2020-02-13 PROCEDURE — 25010000003 CEFTRIAXONE PER 250 MG: Performed by: UROLOGY

## 2020-02-13 PROCEDURE — 80053 COMPREHEN METABOLIC PANEL: CPT | Performed by: UROLOGY

## 2020-02-13 PROCEDURE — 25010000002 KETOROLAC TROMETHAMINE PER 15 MG: Performed by: UROLOGY

## 2020-02-13 PROCEDURE — 83735 ASSAY OF MAGNESIUM: CPT | Performed by: UROLOGY

## 2020-02-13 RX ADMIN — POLYETHYLENE GLYCOL 3350 17 G: 17 POWDER, FOR SOLUTION ORAL at 08:36

## 2020-02-13 RX ADMIN — CEFTRIAXONE SODIUM 2 G: 2 INJECTION, SOLUTION INTRAVENOUS at 02:49

## 2020-02-13 RX ADMIN — HYDROCODONE BITARTRATE AND ACETAMINOPHEN 1 TABLET: 7.5; 325 TABLET ORAL at 09:17

## 2020-02-13 RX ADMIN — KETOROLAC TROMETHAMINE 30 MG: 30 INJECTION, SOLUTION INTRAMUSCULAR at 08:36

## 2020-02-13 RX ADMIN — ACETAMINOPHEN 650 MG: 325 TABLET, FILM COATED ORAL at 06:38

## 2020-02-13 RX ADMIN — HYDROCODONE BITARTRATE AND ACETAMINOPHEN 1 TABLET: 7.5; 325 TABLET ORAL at 20:44

## 2020-02-13 RX ADMIN — HYDROCODONE BITARTRATE AND ACETAMINOPHEN 1 TABLET: 7.5; 325 TABLET ORAL at 02:48

## 2020-02-13 NOTE — ANESTHESIA POSTPROCEDURE EVALUATION
Patient: Sherita Parada    Procedure Summary     Date:  02/12/20 Room / Location:  [facility] OR 01 / [facility] MAIN OR    Anesthesia Start:  1627 Anesthesia Stop:  1703    Procedure:  LT. CYSTO STENT (Left ) Diagnosis:      Surgeon:  Chemo Ramsey MD Provider:  Missael Miller MD    Anesthesia Type:  general ASA Status:  3          Anesthesia Type: general    Vitals  Vitals Value Taken Time   /65 2/12/2020  6:35 PM   Temp 36.3 °C (97.3 °F) 2/12/2020  6:05 PM   Pulse 70 2/12/2020  6:49 PM   Resp 16 2/12/2020  6:35 PM   SpO2 97 % 2/12/2020  6:51 PM   Vitals shown include unvalidated device data.        Post Anesthesia Care and Evaluation    Anesthetic complications: No anesthetic complications

## 2020-02-13 NOTE — PROGRESS NOTES
AFVSS  Feels better.  POD#1  S/p L ureteral stent placement.    A/P- s/p L stent placement.  L ureteral stone, UTI.  Looks beeter.  OK to d/c from  standpoint.  Recommend anitbx x 10 days.  F/u Dr. Ramsey 1 week.  Will plan ureteroscopy.

## 2020-02-13 NOTE — PLAN OF CARE
Patient resting in bed, family at bedside today. Pain medicine given PRN, denies any needs, plan to d/c in AM. Culture results obtained & placed in chart. WCTM

## 2020-02-13 NOTE — CONSULTS
"Diabetes Education  Assessment/Teaching    Patient Name:  Sherita Parada  YOB: 1960  MRN: [id number]  Admit Date:  2/12/2020      Assessment Date:  2/13/2020    Most Recent Value   General Information    Height  160 cm (63\")   Height Method  Stated   Weight  63 kg (138 lb 12.8 oz)   Weight Method  Standing scale   Pregnancy Assessment   Diabetes History   What type of diabetes do you have?  Type 2   Length of Diabetes Diagnosis  1 - 5 years [Patient states that she was diagnosed about 5 years ago and put on Metformin.  She has stopped taking all of her diabetes medications]   Current DM knowledge  good   Have you had diabetes education/teaching in the past?  no   Do you test your blood sugar at home?  yes   Frequency of checks  daily   Who performs the test?  self   Typical readings  160's   Have you had low blood sugar? (<70mg/dl)  no   Have you had high blood sugar? (>140mg/dl)  yes   How often do you have high blood sugar?  occasionally   How often do you check your feet?  daily   Do you perform your own nail care?  yes   Do you have any diabetes complications?  recurring infections   Education Preferences   Nutrition Information   Assessment Topics   Healthy Eating - Assessment  Needs education   Being Active - Assessment  Needs education   Taking Medication - Assessment  Needs education   Problem Solving - Assessment  Needs education   Reducing Risk - Assessment  Needs education   Healthy Coping - Assessment  Competent   Monitoring - Assessment  Competent   DM Goals   Taking Medication - Goal  0-7 days from discharge            Most Recent Value   DM Education Needs   Meter  Has own   Frequency of Testing  Daily   Blood Glucose Target Range  Fasting  and less than 180 2 hours after a meal   Medication  -- [Patient states that she quit taking all of her meds because she felt like she was on too many]   Problem Solving  Hypoglycemia, Hyperglycemia, Sick days, Signs, Symptoms, Treatment "   Reducing Risks  A1C testing, Cardiovascular, Neuropathy, Foot care   Physical Activity Frequency  Discussed exercise importance   Healthy Coping  Appropriate   Discharge Plan  Home   Motivation  Moderate, Engaged   Teaching Method  Handouts, Discussion, Explanation   Patient Response  Verbalized understanding            Other Comments:  Explained to patient that the plan is for her to go home on oral diabetes meds.  She states that she does not want to take metformin because she has heard bad thinks about this medication.  I explained that there were other oral options and she was not convinced that she is willing to try.  She would to control with diet and exercise.  She does check her blood sugars every morning and they usually run around 160.  Explained long term complications related to uncontrolled blood sugars.  Patient verbalized understanding.  Thank you for this referral.  Please reconsult if needed.        Electronically signed by:  Aleena Madsen RN  02/13/20 12:05 PM

## 2020-02-13 NOTE — PROGRESS NOTES
Interval History  Admitted from an outside ED with fever and flank pain due to pyelonephritis and possible obstructing kidney stone.    She underwent ureteral stent placement yesterday and feels improved today.  Tolerating diet.  Voiding adequately.     Physical Exam  Temp:  [97.8 °F (36.6 °C)-101.5 °F (38.6 °C)] 101.5 °F (38.6 °C)  Heart Rate:  [] 110  Resp:  [16] 16  BP: (127-135)/(77) 135/77  Constitutional: alert, cooperative and Sickly appearing   Neck: no JVD  Respiratory: clear to auscultation, unlabored  Cardiovascular: tachycardia  Abdominal: soft, non-tender, non-distended; BS normal; no masses or organomegaly  Musculoskeletal: no edema  Neurological: alert and oriented x 3, strength and sensation grossly normal     Labs reviewed  WBC normal  Cr normal    A1c 9.5    Assessment/Plan  · Continue Rocephin 2 g IV daily  Follow-up blood culture results from outside ED. Discussed with RN.  · BS stable but A1c quite elevated.  Will need to restart her oral hypoglycemics at discharge.  Diabetic education pending.  · Will stop IV fluids.  · Oral pain medication.  · Anticipate discharge tomorrow if okay with urology.    Active Hospital Problems    Diagnosis  POA   • **Acute pyelonephritis [N10]  Yes   • Ureterolithiasis [N20.1]  Yes   • Type 2 diabetes mellitus without complication (CMS/HCC) [E11.9]  Yes   • Sepsis without acute organ dysfunction (CMS/HCC) [A41.9]  Yes   • Hyponatremia [E87.1]  Yes      Resolved Hospital Problems   No resolved problems to display.       Electronically signed by Young Camacho MD, 2/13/2020, 8:21 AM.

## 2020-02-14 VITALS
HEIGHT: 63 IN | TEMPERATURE: 96.7 F | DIASTOLIC BLOOD PRESSURE: 87 MMHG | OXYGEN SATURATION: 95 % | WEIGHT: 138.8 LBS | BODY MASS INDEX: 24.59 KG/M2 | HEART RATE: 80 BPM | SYSTOLIC BLOOD PRESSURE: 157 MMHG | RESPIRATION RATE: 16 BRPM

## 2020-02-14 PROBLEM — A41.9 SEPSIS WITHOUT ACUTE ORGAN DYSFUNCTION (HCC): Status: RESOLVED | Noted: 2020-02-12 | Resolved: 2020-02-14

## 2020-02-14 PROBLEM — E87.1 HYPONATREMIA: Status: RESOLVED | Noted: 2020-02-12 | Resolved: 2020-02-14

## 2020-02-14 PROCEDURE — 25010000003 CEFTRIAXONE PER 250 MG: Performed by: UROLOGY

## 2020-02-14 RX ORDER — HYDROCODONE BITARTRATE AND ACETAMINOPHEN 7.5; 325 MG/1; MG/1
1 TABLET ORAL EVERY 6 HOURS PRN
Qty: 15 TABLET | Refills: 0 | Status: SHIPPED | OUTPATIENT
Start: 2020-02-14 | End: 2020-02-22

## 2020-02-14 RX ORDER — AMPICILLIN 500 MG/1
500 CAPSULE ORAL EVERY 6 HOURS SCHEDULED
Status: DISCONTINUED | OUTPATIENT
Start: 2020-02-15 | End: 2020-02-14 | Stop reason: HOSPADM

## 2020-02-14 RX ORDER — AMPICILLIN 500 MG/1
500 CAPSULE ORAL EVERY 6 HOURS SCHEDULED
Qty: 28 CAPSULE | Refills: 0 | Status: SHIPPED | OUTPATIENT
Start: 2020-02-15 | End: 2020-02-22

## 2020-02-14 RX ADMIN — CEFTRIAXONE SODIUM 2 G: 2 INJECTION, SOLUTION INTRAVENOUS at 03:54

## 2020-02-14 RX ADMIN — POLYETHYLENE GLYCOL 3350 17 G: 17 POWDER, FOR SOLUTION ORAL at 08:34

## 2020-02-14 RX ADMIN — HYDROCODONE BITARTRATE AND ACETAMINOPHEN 1 TABLET: 7.5; 325 TABLET ORAL at 11:21

## 2020-02-14 NOTE — PROGRESS NOTES
Case Management Discharge Note      Final Note: Discharged to home on 2/14/2020 by private freedom Fuentes Rn, CCP.          Destination      No service has been selected for the patient.      Durable Medical Equipment      No service has been selected for the patient.      Dialysis/Infusion      No service has been selected for the patient.      Home Medical Care      No service has been selected for the patient.      Therapy      No service has been selected for the patient.      Community Resources      No service has been selected for the patient.        Transportation Services  Private: Car    Final Discharge Disposition Code: 01 - home or self-care

## 2020-02-14 NOTE — NURSING NOTE
[place] was delivered from [facility] pharmacy, but ampicillin not available but Rx was transferred to [facility] per pt request

## 2020-02-14 NOTE — DISCHARGE SUMMARY
Patient Name: Sherita Parada  : 1960  MRN: [id number]    Date of Admission: 2020  Date of Discharge:  2020  Primary Care Physician: Placido Ruiz MD      Chief Complaint:   No chief complaint on file.      Discharge Diagnoses     Active Hospital Problems    Diagnosis  POA   • **Acute pyelonephritis [N10]  Yes   • Ureterolithiasis [N20.1]  Yes   • Type 2 diabetes mellitus without complication (CMS/HCC) [E11.9]  Yes      Resolved Hospital Problems    Diagnosis Date Resolved POA   • Sepsis without acute organ dysfunction (CMS/HCC) [A41.9] 2020 Yes   • Hyponatremia [E87.1] 2020 Yes        Hospital Course     Ms. Parada is a 60 y.o. former smoker with a history of diabetes who presented to [facility] initially complaining of left-sided flank and abdominal pain.  Please see the admitting history and physical for further details.  She was initially seen at an outside ED.  CT scan done at that facility showed pyelonephritis and possible obstructing kidney stone.  She was transferred here for further evaluation and urologic consultation.  She was seen by urology and had cystoscopy performed with left ureteral stent placement.  She has had improvement of her symptoms and is remained afebrile.  She seems stable for discharge today.  Urology recommends 10 days of antibiotics and follow-up in 1 week with ureteroscopy.    Report from outside ED said urine culture growing group B streptococcus generally sensitive to penicillins.  She has received 3 doses of IV Rocephin.  Will prescribe 7 additional days of ampicillin to start tomorrow which will equate to 10 days of treatment.    She has known history of poorly controlled diabetes.  She was seen by diabetic nurse educator and explained the importance of improving her glycemic control.  We offered to initiate oral hypoglycemic medications but patient resistant at this time.  She states she will work on her diet and exercise.   Obviously this needs close follow-up with her PCP.      Day of Discharge     She denies any chest pain, SOA, nausea, vomiting or diarrhea.     Physical Exam:  Temp:  [96.8 °F (36 °C)-97.7 °F (36.5 °C)] 97.1 °F (36.2 °C)  Heart Rate:  [72-85] 82  Resp:  [16-18] 16  BP: (135-160)/(83-89) 152/89  Body mass index is 24.59 kg/m².  Physical Exam   Constitutional: She is oriented to person, place, and time. No distress.   Cardiovascular: Normal rate and regular rhythm.   No murmur heard.  Pulmonary/Chest: Effort normal and breath sounds normal.   Abdominal: Soft. Bowel sounds are normal. She exhibits no distension. There is no tenderness.   Musculoskeletal: Normal range of motion. She exhibits no edema.   Neurological: She is alert and oriented to person, place, and time.   Skin: Skin is warm and dry. She is not diaphoretic.       Consultants     Consult Orders (all) (From admission, onward)     Start     Ordered    02/12/20 0212  Inpatient Urology Consult  Once     Specialty:  Urology  Provider:  Esdras Ugalde Jr., MD    02/12/20 0212              Procedures     LT. CYSTO STENT  Surgeon: Chemo Ramsey MD  Date of Service:  02/12/20    Imaging Results (All)     Procedure Component Value Units Date/Time    XR Abdomen 1 View [[id number]] Collected:  02/12/20 1713     Updated:  02/12/20 1716    Narrative:       XR ABDOMEN 1 VW-, FL C ARM DURING SURGERY-     INDICATIONS: Left ureteral stent placement     TECHNIQUE: FLUOROSCOPIC ASSISTANCE IN THE OPERATING ROOM.     FINDINGS:     2 intraoperative fluoroscopic spot views were obtained and recorded the  PACS for review, revealing left ureteral stent placement. Please see  operative report for full details.     Fluoroscopy time: 6 seconds       Impression:          As described.     This report was finalized on 2/12/2020 5:13 PM by Dr. Demarcus Castano M.D.       FL C Arm During Surgery [[id number]] Collected:  02/12/20 1713     Updated:  02/12/20 1716     Narrative:       XR ABDOMEN 1 VW-, FL C ARM DURING SURGERY-     INDICATIONS: Left ureteral stent placement     TECHNIQUE: FLUOROSCOPIC ASSISTANCE IN THE OPERATING ROOM.     FINDINGS:     2 intraoperative fluoroscopic spot views were obtained and recorded the  PACS for review, revealing left ureteral stent placement. Please see  operative report for full details.     Fluoroscopy time: 6 seconds       Impression:          As described.     This report was finalized on 2/12/2020 5:13 PM by Dr. Demarcus Castano M.D.                 Pertinent Labs     Results from last 7 days   Lab Units 02/13/20  0550 02/12/20  0728   WBC 10*3/mm3 7.56 9.50   HEMOGLOBIN g/dL 10.6* 10.7*   PLATELETS 10*3/mm3 216 226     Results from last 7 days   Lab Units 02/13/20  0550 02/12/20  0728   SODIUM mmol/L 137 136   POTASSIUM mmol/L 4.0 4.0   CHLORIDE mmol/L 103 103   CO2 mmol/L 21.0* 20.8*   BUN mg/dL 14 19   CREATININE mg/dL 0.67 0.90   GLUCOSE mg/dL 120* 154*   Estimated Creatinine Clearance: 80.8 mL/min (by C-G formula based on SCr of 0.67 mg/dL).  Results from last 7 days   Lab Units 02/13/20  0550   ALBUMIN g/dL 2.60*   BILIRUBIN mg/dL 0.3   ALK PHOS U/L 77   AST (SGOT) U/L 10   ALT (SGPT) U/L 7     Results from last 7 days   Lab Units 02/13/20  0550 02/12/20  0728   CALCIUM mg/dL 8.2* 8.5*   ALBUMIN g/dL 2.60*  --    MAGNESIUM mg/dL 1.7  --                Invalid input(s): LDLCALC  Results from last 7 days   Lab Units 02/12/20  0736 02/12/20  0728   BLOODCX  No growth at 2 days No growth at 2 days       Test Results Pending at Discharge      Order Current Status    Blood Culture - Blood, Arm, Right Preliminary result    Blood Culture - Blood, Hand, Right Preliminary result          Discharge Details        Discharge Medications      New Medications      Instructions Start Date   ampicillin 500 MG capsule  Commonly known as:  PRINCIPEN   500 mg, Oral, Every 6 Hours Scheduled   Start Date:  February 15, 2020      HYDROcodone-acetaminophen 7.5-325 MG per tablet  Commonly known as:  NORCO   1 tablet, Oral, Every 6 Hours PRN             No Known Allergies      Discharge Disposition:  Home or Self Care    Discharge Diet:  Diet Order   Procedures   • Diet Regular       Discharge Activity:   Activity Instructions     Activity as Tolerated            CODE STATUS:    Code Status and Medical Interventions:   Ordered at: 02/12/20 0212     Code Status:    CPR     Medical Interventions (Level of Support Prior to Arrest):    Full       No future appointments.  Follow-up Information     Placido Ruiz MD Follow up in 2 week(s).    Specialty:  Family Medicine  Contact information:  [address] [place] [address] DR MURO [address] [place] [address] [id number] [phone]             Chemo Ramsey MD Follow up in 1 week(s).    Specialty:  Urology  Contact information:  [address] [place] [address] [place] [address] [phone]                   Time Spent on Discharge:  Greater than 30 minutes      Young Camacho MD  [facility]  02/14/20  9:36 AM

## 2020-02-14 NOTE — PLAN OF CARE
Patient rested well overnight.  Pain medication given once per her request.  IV ABX given as per orders.  Will continue to monitor.

## 2020-02-17 LAB
BACTERIA SPEC AEROBE CULT: NORMAL
BACTERIA SPEC AEROBE CULT: NORMAL

## 2020-02-17 NOTE — PAYOR COMM NOTE
"Gabi Baez (60 y.o. Female)     Mary [phone] or fax [phone]    Ref# [id number]    See attached DC summary       Date of Birth Social Security Number Address Home Phone MRN    1960  [address] [facility]  [facility] [address] [phone] [id number]    [employer] Marital Status          Mormon        Admission Date Admission Type Admitting Provider Attending Provider Department, Room/Bed    20 Urgent Young Camacho MD  [address], P490/1    Discharge Date Discharge Disposition Discharge Destination        2020 Home or Self Care              Attending Provider:  (none)   Allergies:  No Known Allergies    Isolation:  None   Infection:  None   Code Status:  Prior    Ht:  160 cm (63\")   Wt:  63 kg (138 lb 12.8 oz)    Admission Cmt:  None   Principal Problem:  Acute pyelonephritis [N10]                 Active Insurance as of 2020     Primary Coverage     Payor Plan Insurance Group Employer/Plan Group    [employer] [facility] [id number]     Payor Plan Address Payor Plan Phone Number Payor Plan Fax Number Effective Dates    PO    2020 - None Entered    [facility] [id number]       Subscriber Name Subscriber Birth Date Member ID       GABI BAEZ 1960 [id number]                 Emergency Contacts      (Rel.) Home Phone Work Phone Mobile Phone    Placido Baez (Spouse) [phone] -- [phone]               Discharge Summary      Young Camacho MD at 20 0936              Patient Name: Gabi Baez  : 1960  MRN: [id number]    Date of Admission: 2020  Date of Discharge:  2020  Primary Care Physician: Placido Ruiz MD      Chief Complaint:   No chief complaint on file.      Discharge Diagnoses     Active Hospital Problems    Diagnosis  POA   • **Acute pyelonephritis [N10]  Yes   • Ureterolithiasis [N20.1]  Yes   • Type 2 diabetes mellitus without complication (CMS/HCC) [E11.9]  Yes      Resolved " Hospital Problems    Diagnosis Date Resolved POA   • Sepsis without acute organ dysfunction (CMS/[facility]) [A41.9] 02/14/2020 Yes   • Hyponatremia [E87.1] 02/14/2020 Yes        Hospital Course     Ms. Parada is a 60 y.o. former smoker with a history of diabetes who presented to [facility] initially complaining of left-sided flank and abdominal pain.  Please see the admitting history and physical for further details.  She was initially seen at an outside ED.  CT scan done at that facility showed pyelonephritis and possible obstructing kidney stone.  She was transferred here for further evaluation and urologic consultation.  She was seen by urology and had cystoscopy performed with left ureteral stent placement.  She has had improvement of her symptoms and is remained afebrile.  She seems stable for discharge today.  Urology recommends 10 days of antibiotics and follow-up in 1 week with ureteroscopy.    Report from outside ED said urine culture growing group B streptococcus generally sensitive to penicillins.  She has received 3 doses of IV Rocephin.  Will prescribe 7 additional days of ampicillin to start tomorrow which will equate to 10 days of treatment.    She has known history of poorly controlled diabetes.  She was seen by diabetic nurse educator and explained the importance of improving her glycemic control.  We offered to initiate oral hypoglycemic medications but patient resistant at this time.  She states she will work on her diet and exercise.  Obviously this needs close follow-up with her PCP.      Day of Discharge     She denies any chest pain, SOA, nausea, vomiting or diarrhea.     Physical Exam:  Temp:  [96.8 °F (36 °C)-97.7 °F (36.5 °C)] 97.1 °F (36.2 °C)  Heart Rate:  [72-85] 82  Resp:  [16-18] 16  BP: (135-160)/(83-89) 152/89  Body mass index is 24.59 kg/m².  Physical Exam   Constitutional: She is oriented to person, place, and time. No distress.   Cardiovascular: Normal rate and regular  rhythm.   No murmur heard.  Pulmonary/Chest: Effort normal and breath sounds normal.   Abdominal: Soft. Bowel sounds are normal. She exhibits no distension. There is no tenderness.   Musculoskeletal: Normal range of motion. She exhibits no edema.   Neurological: She is alert and oriented to person, place, and time.   Skin: Skin is warm and dry. She is not diaphoretic.       Consultants     Consult Orders (all) (From admission, onward)     Start     Ordered    02/12/20 0212  Inpatient Urology Consult  Once     Specialty:  Urology  Provider:  Esdras Ugalde Jr., MD    02/12/20 0212              Procedures     LT. CYSTO STENT  Surgeon: Chemo Ramsey MD  Date of Service:  02/12/20    Imaging Results (All)     Procedure Component Value Units Date/Time    XR Abdomen 1 View [[id number]] Collected:  02/12/20 1713     Updated:  02/12/20 1716    Narrative:       XR ABDOMEN 1 VW-, FL C ARM DURING SURGERY-     INDICATIONS: Left ureteral stent placement     TECHNIQUE: FLUOROSCOPIC ASSISTANCE IN THE OPERATING ROOM.     FINDINGS:     2 intraoperative fluoroscopic spot views were obtained and recorded the  PACS for review, revealing left ureteral stent placement. Please see  operative report for full details.     Fluoroscopy time: 6 seconds       Impression:          As described.     This report was finalized on 2/12/2020 5:13 PM by Dr. Demarcus Castano M.D.       FL C Arm During Surgery [[id number]] Collected:  02/12/20 1713     Updated:  02/12/20 1716    Narrative:       XR ABDOMEN 1 VW-, FL C ARM DURING SURGERY-     INDICATIONS: Left ureteral stent placement     TECHNIQUE: FLUOROSCOPIC ASSISTANCE IN THE OPERATING ROOM.     FINDINGS:     2 intraoperative fluoroscopic spot views were obtained and recorded the  PACS for review, revealing left ureteral stent placement. Please see  operative report for full details.     Fluoroscopy time: 6 seconds       Impression:          As described.     This report was finalized  on 2/12/2020 5:13 PM by Dr. Demarcus Castano M.D.                 Pertinent Labs     Results from last 7 days   Lab Units 02/13/20  0550 02/12/20  0728   WBC 10*3/mm3 7.56 9.50   HEMOGLOBIN g/dL 10.6* 10.7*   PLATELETS 10*3/mm3 216 226     Results from last 7 days   Lab Units 02/13/20  0550 02/12/20  0728   SODIUM mmol/L 137 136   POTASSIUM mmol/L 4.0 4.0   CHLORIDE mmol/L 103 103   CO2 mmol/L 21.0* 20.8*   BUN mg/dL 14 19   CREATININE mg/dL 0.67 0.90   GLUCOSE mg/dL 120* 154*   Estimated Creatinine Clearance: 80.8 mL/min (by C-G formula based on SCr of 0.67 mg/dL).  Results from last 7 days   Lab Units 02/13/20  0550   ALBUMIN g/dL 2.60*   BILIRUBIN mg/dL 0.3   ALK PHOS U/L 77   AST (SGOT) U/L 10   ALT (SGPT) U/L 7     Results from last 7 days   Lab Units 02/13/20  0550 02/12/20  0728   CALCIUM mg/dL 8.2* 8.5*   ALBUMIN g/dL 2.60*  --    MAGNESIUM mg/dL 1.7  --                Invalid input(s): LDLCALC  Results from last 7 days   Lab Units 02/12/20  0736 02/12/20  0728   BLOODCX  No growth at 2 days No growth at 2 days       Test Results Pending at Discharge      Order Current Status    Blood Culture - Blood, Arm, Right Preliminary result    Blood Culture - Blood, Hand, Right Preliminary result          Discharge Details        Discharge Medications      New Medications      Instructions Start Date   ampicillin 500 MG capsule  Commonly known as:  PRINCIPEN   500 mg, Oral, Every 6 Hours Scheduled   Start Date:  February 15, 2020     HYDROcodone-acetaminophen 7.5-325 MG per tablet  Commonly known as:  NORCO   1 tablet, Oral, Every 6 Hours PRN             No Known Allergies      Discharge Disposition:  Home or Self Care    Discharge Diet:  Diet Order   Procedures   • Diet Regular       Discharge Activity:   Activity Instructions     Activity as Tolerated            CODE STATUS:    Code Status and Medical Interventions:   Ordered at: 02/12/20 0212     Code Status:    CPR     Medical Interventions (Level of Support  Prior to Arrest):    Full       No future appointments.  Follow-up Information     Placido Ruiz MD Follow up in 2 week(s).    Specialty:  Family Medicine  Contact information:  [address] [place] [address] DR MURO [address] [place] [address] [id number] [phone]             Chemo Ramsey MD Follow up in 1 week(s).    Specialty:  Urology  Contact information:  [address] [place] [address] [phone]                   Time Spent on Discharge:  Greater than 30 minutes      Young Camacho MD  [facility]  02/14/20  9:36 AM                Electronically signed by Young Camacho MD at 02/14/20 3145

## 2020-02-25 ENCOUNTER — APPOINTMENT (OUTPATIENT)
Dept: GENERAL RADIOLOGY | Facility: HOSPITAL | Age: 60
End: 2020-02-25

## 2020-02-25 ENCOUNTER — ANESTHESIA EVENT (OUTPATIENT)
Dept: PERIOP | Facility: HOSPITAL | Age: 60
End: 2020-02-25

## 2020-02-25 ENCOUNTER — ANESTHESIA (OUTPATIENT)
Dept: PERIOP | Facility: HOSPITAL | Age: 60
End: 2020-02-25

## 2020-02-25 ENCOUNTER — HOSPITAL ENCOUNTER (OUTPATIENT)
Facility: HOSPITAL | Age: 60
Setting detail: HOSPITAL OUTPATIENT SURGERY
Discharge: HOME | End: 2020-02-25
Attending: UROLOGY | Admitting: UROLOGY

## 2020-02-25 VITALS
RESPIRATION RATE: 18 BRPM | OXYGEN SATURATION: 95 % | WEIGHT: 136.91 LBS | SYSTOLIC BLOOD PRESSURE: 144 MMHG | HEART RATE: 85 BPM | HEIGHT: 63 IN | TEMPERATURE: 97.6 F | DIASTOLIC BLOOD PRESSURE: 79 MMHG | BODY MASS INDEX: 24.26 KG/M2

## 2020-02-25 DIAGNOSIS — N20.0 KIDNEY STONE ON LEFT SIDE: ICD-10-CM

## 2020-02-25 LAB
GLUCOSE BLDC GLUCOMTR-MCNC: 110 MG/DL (ref 70–130)
GLUCOSE BLDC GLUCOMTR-MCNC: 185 MG/DL (ref 70–130)

## 2020-02-25 PROCEDURE — C2617 STENT, NON-COR, TEM W/O DEL: HCPCS | Performed by: UROLOGY

## 2020-02-25 PROCEDURE — 25010000002 MIDAZOLAM PER 1 MG: Performed by: ANESTHESIOLOGY

## 2020-02-25 PROCEDURE — 82962 GLUCOSE BLOOD TEST: CPT

## 2020-02-25 PROCEDURE — 25010000002 LEVOFLOXACIN PER 250 MG: Performed by: UROLOGY

## 2020-02-25 PROCEDURE — 25010000002 FENTANYL CITRATE (PF) 100 MCG/2ML SOLUTION: Performed by: ANESTHESIOLOGY

## 2020-02-25 PROCEDURE — 82365 CALCULUS SPECTROSCOPY: CPT | Performed by: UROLOGY

## 2020-02-25 PROCEDURE — 76000 FLUOROSCOPY <1 HR PHYS/QHP: CPT

## 2020-02-25 PROCEDURE — 74018 RADEX ABDOMEN 1 VIEW: CPT

## 2020-02-25 PROCEDURE — 25010000002 ONDANSETRON PER 1 MG: Performed by: ANESTHESIOLOGY

## 2020-02-25 PROCEDURE — 25010000002 KETOROLAC TROMETHAMINE PER 15 MG: Performed by: ANESTHESIOLOGY

## 2020-02-25 PROCEDURE — 25010000002 PROPOFOL 10 MG/ML EMULSION: Performed by: ANESTHESIOLOGY

## 2020-02-25 DEVICE — URETERAL STENT
Type: IMPLANTABLE DEVICE | Site: URETER | Status: FUNCTIONAL
Brand: PERCUFLEX™ PLUS

## 2020-02-25 RX ORDER — SODIUM CHLORIDE 0.9 % (FLUSH) 0.9 %
3-10 SYRINGE (ML) INJECTION AS NEEDED
Status: DISCONTINUED | OUTPATIENT
Start: 2020-02-25 | End: 2020-02-25 | Stop reason: HOSPADM

## 2020-02-25 RX ORDER — FAMOTIDINE 10 MG/ML
20 INJECTION, SOLUTION INTRAVENOUS ONCE
Status: COMPLETED | OUTPATIENT
Start: 2020-02-25 | End: 2020-02-25

## 2020-02-25 RX ORDER — HYDROCODONE BITARTRATE AND ACETAMINOPHEN 5; 325 MG/1; MG/1
1 TABLET ORAL EVERY 6 HOURS PRN
Qty: 20 TABLET | Refills: 0 | Status: SHIPPED | OUTPATIENT
Start: 2020-02-25 | End: 2022-08-03 | Stop reason: HOSPADM

## 2020-02-25 RX ORDER — DIPHENHYDRAMINE HCL 25 MG
25 CAPSULE ORAL
Status: DISCONTINUED | OUTPATIENT
Start: 2020-02-25 | End: 2020-02-25 | Stop reason: HOSPADM

## 2020-02-25 RX ORDER — MAGNESIUM HYDROXIDE 1200 MG/15ML
LIQUID ORAL AS NEEDED
Status: DISCONTINUED | OUTPATIENT
Start: 2020-02-25 | End: 2020-02-25 | Stop reason: HOSPADM

## 2020-02-25 RX ORDER — PROMETHAZINE HYDROCHLORIDE 25 MG/1
25 TABLET ORAL ONCE AS NEEDED
Status: DISCONTINUED | OUTPATIENT
Start: 2020-02-25 | End: 2020-02-25 | Stop reason: HOSPADM

## 2020-02-25 RX ORDER — NITROFURANTOIN 25; 75 MG/1; MG/1
100 CAPSULE ORAL 2 TIMES DAILY
Qty: 10 CAPSULE | Refills: 0 | Status: SHIPPED | OUTPATIENT
Start: 2020-02-25

## 2020-02-25 RX ORDER — HYDROMORPHONE HYDROCHLORIDE 1 MG/ML
0.5 INJECTION, SOLUTION INTRAMUSCULAR; INTRAVENOUS; SUBCUTANEOUS
Status: DISCONTINUED | OUTPATIENT
Start: 2020-02-25 | End: 2020-02-25 | Stop reason: HOSPADM

## 2020-02-25 RX ORDER — OXYCODONE AND ACETAMINOPHEN 7.5; 325 MG/1; MG/1
1 TABLET ORAL ONCE AS NEEDED
Status: DISCONTINUED | OUTPATIENT
Start: 2020-02-25 | End: 2020-02-25 | Stop reason: HOSPADM

## 2020-02-25 RX ORDER — FLUMAZENIL 0.1 MG/ML
0.2 INJECTION INTRAVENOUS AS NEEDED
Status: DISCONTINUED | OUTPATIENT
Start: 2020-02-25 | End: 2020-02-25 | Stop reason: HOSPADM

## 2020-02-25 RX ORDER — PROMETHAZINE HYDROCHLORIDE 25 MG/ML
6.25 INJECTION, SOLUTION INTRAMUSCULAR; INTRAVENOUS
Status: DISCONTINUED | OUTPATIENT
Start: 2020-02-25 | End: 2020-02-25 | Stop reason: HOSPADM

## 2020-02-25 RX ORDER — LIDOCAINE HYDROCHLORIDE 10 MG/ML
0.5 INJECTION, SOLUTION EPIDURAL; INFILTRATION; INTRACAUDAL; PERINEURAL ONCE AS NEEDED
Status: DISCONTINUED | OUTPATIENT
Start: 2020-02-25 | End: 2020-02-25 | Stop reason: HOSPADM

## 2020-02-25 RX ORDER — ONDANSETRON 2 MG/ML
INJECTION INTRAMUSCULAR; INTRAVENOUS AS NEEDED
Status: DISCONTINUED | OUTPATIENT
Start: 2020-02-25 | End: 2020-02-25 | Stop reason: SURG

## 2020-02-25 RX ORDER — LEVOFLOXACIN 5 MG/ML
500 INJECTION, SOLUTION INTRAVENOUS ONCE
Status: COMPLETED | OUTPATIENT
Start: 2020-02-25 | End: 2020-02-25

## 2020-02-25 RX ORDER — DIPHENHYDRAMINE HYDROCHLORIDE 50 MG/ML
12.5 INJECTION INTRAMUSCULAR; INTRAVENOUS
Status: DISCONTINUED | OUTPATIENT
Start: 2020-02-25 | End: 2020-02-25 | Stop reason: HOSPADM

## 2020-02-25 RX ORDER — PROPOFOL 10 MG/ML
VIAL (ML) INTRAVENOUS AS NEEDED
Status: DISCONTINUED | OUTPATIENT
Start: 2020-02-25 | End: 2020-02-25 | Stop reason: SURG

## 2020-02-25 RX ORDER — ACETAMINOPHEN 325 MG/1
650 TABLET ORAL ONCE AS NEEDED
Status: DISCONTINUED | OUTPATIENT
Start: 2020-02-25 | End: 2020-02-25 | Stop reason: HOSPADM

## 2020-02-25 RX ORDER — SODIUM CHLORIDE 0.9 % (FLUSH) 0.9 %
3 SYRINGE (ML) INJECTION EVERY 12 HOURS SCHEDULED
Status: DISCONTINUED | OUTPATIENT
Start: 2020-02-25 | End: 2020-02-25 | Stop reason: HOSPADM

## 2020-02-25 RX ORDER — FENTANYL CITRATE 50 UG/ML
INJECTION, SOLUTION INTRAMUSCULAR; INTRAVENOUS AS NEEDED
Status: DISCONTINUED | OUTPATIENT
Start: 2020-02-25 | End: 2020-02-25 | Stop reason: SURG

## 2020-02-25 RX ORDER — MIDAZOLAM HYDROCHLORIDE 1 MG/ML
1 INJECTION INTRAMUSCULAR; INTRAVENOUS
Status: DISCONTINUED | OUTPATIENT
Start: 2020-02-25 | End: 2020-02-25 | Stop reason: HOSPADM

## 2020-02-25 RX ORDER — LIDOCAINE HYDROCHLORIDE 20 MG/ML
INJECTION, SOLUTION INFILTRATION; PERINEURAL AS NEEDED
Status: DISCONTINUED | OUTPATIENT
Start: 2020-02-25 | End: 2020-02-25 | Stop reason: SURG

## 2020-02-25 RX ORDER — KETOROLAC TROMETHAMINE 30 MG/ML
INJECTION, SOLUTION INTRAMUSCULAR; INTRAVENOUS AS NEEDED
Status: DISCONTINUED | OUTPATIENT
Start: 2020-02-25 | End: 2020-02-25 | Stop reason: SURG

## 2020-02-25 RX ORDER — MIDAZOLAM HYDROCHLORIDE 1 MG/ML
2 INJECTION INTRAMUSCULAR; INTRAVENOUS
Status: DISCONTINUED | OUTPATIENT
Start: 2020-02-25 | End: 2020-02-25 | Stop reason: HOSPADM

## 2020-02-25 RX ORDER — ONDANSETRON 2 MG/ML
4 INJECTION INTRAMUSCULAR; INTRAVENOUS ONCE AS NEEDED
Status: DISCONTINUED | OUTPATIENT
Start: 2020-02-25 | End: 2020-02-25 | Stop reason: HOSPADM

## 2020-02-25 RX ORDER — PROMETHAZINE HYDROCHLORIDE 25 MG/ML
12.5 INJECTION, SOLUTION INTRAMUSCULAR; INTRAVENOUS ONCE AS NEEDED
Status: DISCONTINUED | OUTPATIENT
Start: 2020-02-25 | End: 2020-02-25 | Stop reason: HOSPADM

## 2020-02-25 RX ORDER — SODIUM CHLORIDE, SODIUM LACTATE, POTASSIUM CHLORIDE, CALCIUM CHLORIDE 600; 310; 30; 20 MG/100ML; MG/100ML; MG/100ML; MG/100ML
9 INJECTION, SOLUTION INTRAVENOUS CONTINUOUS
Status: DISCONTINUED | OUTPATIENT
Start: 2020-02-25 | End: 2020-02-25 | Stop reason: HOSPADM

## 2020-02-25 RX ORDER — HYDROCODONE BITARTRATE AND ACETAMINOPHEN 7.5; 325 MG/1; MG/1
1 TABLET ORAL ONCE AS NEEDED
Status: COMPLETED | OUTPATIENT
Start: 2020-02-25 | End: 2020-02-25

## 2020-02-25 RX ORDER — PROMETHAZINE HYDROCHLORIDE 25 MG/1
25 SUPPOSITORY RECTAL ONCE AS NEEDED
Status: DISCONTINUED | OUTPATIENT
Start: 2020-02-25 | End: 2020-02-25 | Stop reason: HOSPADM

## 2020-02-25 RX ORDER — EPHEDRINE SULFATE 50 MG/ML
5 INJECTION, SOLUTION INTRAVENOUS ONCE AS NEEDED
Status: DISCONTINUED | OUTPATIENT
Start: 2020-02-25 | End: 2020-02-25 | Stop reason: HOSPADM

## 2020-02-25 RX ORDER — HYDRALAZINE HYDROCHLORIDE 20 MG/ML
5 INJECTION INTRAMUSCULAR; INTRAVENOUS
Status: DISCONTINUED | OUTPATIENT
Start: 2020-02-25 | End: 2020-02-25 | Stop reason: HOSPADM

## 2020-02-25 RX ORDER — NALOXONE HCL 0.4 MG/ML
0.2 VIAL (ML) INJECTION AS NEEDED
Status: DISCONTINUED | OUTPATIENT
Start: 2020-02-25 | End: 2020-02-25 | Stop reason: HOSPADM

## 2020-02-25 RX ORDER — FENTANYL CITRATE 50 UG/ML
50 INJECTION, SOLUTION INTRAMUSCULAR; INTRAVENOUS
Status: DISCONTINUED | OUTPATIENT
Start: 2020-02-25 | End: 2020-02-25 | Stop reason: HOSPADM

## 2020-02-25 RX ORDER — LABETALOL HYDROCHLORIDE 5 MG/ML
5 INJECTION, SOLUTION INTRAVENOUS
Status: DISCONTINUED | OUTPATIENT
Start: 2020-02-25 | End: 2020-02-25 | Stop reason: HOSPADM

## 2020-02-25 RX ADMIN — MIDAZOLAM 1 MG: 1 INJECTION INTRAMUSCULAR; INTRAVENOUS at 11:38

## 2020-02-25 RX ADMIN — KETOROLAC TROMETHAMINE 30 MG: 30 INJECTION, SOLUTION INTRAMUSCULAR; INTRAVENOUS at 12:27

## 2020-02-25 RX ADMIN — FENTANYL CITRATE 25 MCG: 50 INJECTION INTRAMUSCULAR; INTRAVENOUS at 12:21

## 2020-02-25 RX ADMIN — PROPOFOL 150 MG: 10 INJECTION, EMULSION INTRAVENOUS at 12:06

## 2020-02-25 RX ADMIN — FAMOTIDINE 20 MG: 10 INJECTION INTRAVENOUS at 10:42

## 2020-02-25 RX ADMIN — ONDANSETRON HYDROCHLORIDE 4 MG: 2 SOLUTION INTRAMUSCULAR; INTRAVENOUS at 12:20

## 2020-02-25 RX ADMIN — FENTANYL CITRATE 25 MCG: 50 INJECTION INTRAMUSCULAR; INTRAVENOUS at 12:03

## 2020-02-25 RX ADMIN — FENTANYL CITRATE 25 MCG: 50 INJECTION INTRAMUSCULAR; INTRAVENOUS at 12:14

## 2020-02-25 RX ADMIN — FENTANYL CITRATE 25 MCG: 50 INJECTION INTRAMUSCULAR; INTRAVENOUS at 12:27

## 2020-02-25 RX ADMIN — MIDAZOLAM 1 MG: 1 INJECTION INTRAMUSCULAR; INTRAVENOUS at 10:42

## 2020-02-25 RX ADMIN — FENTANYL CITRATE 50 MCG: 50 INJECTION, SOLUTION INTRAMUSCULAR; INTRAVENOUS at 13:46

## 2020-02-25 RX ADMIN — LEVOFLOXACIN 500 MG: 5 INJECTION, SOLUTION INTRAVENOUS at 11:42

## 2020-02-25 RX ADMIN — HYDROCODONE BITARTRATE AND ACETAMINOPHEN 1 TABLET: 7.5; 325 TABLET ORAL at 13:59

## 2020-02-25 RX ADMIN — LIDOCAINE HYDROCHLORIDE 60 MG: 20 INJECTION, SOLUTION INFILTRATION; PERINEURAL at 12:06

## 2020-02-25 RX ADMIN — SODIUM CHLORIDE, POTASSIUM CHLORIDE, SODIUM LACTATE AND CALCIUM CHLORIDE 9 ML/HR: 600; 310; 30; 20 INJECTION, SOLUTION INTRAVENOUS at 10:42

## 2020-02-25 NOTE — ANESTHESIA PREPROCEDURE EVALUATION
Anesthesia Evaluation     Patient summary reviewed and Nursing notes reviewed   NPO Solid Status: > 8 hours  NPO Liquid Status: > 8 hours           Airway   Mallampati: II  TM distance: >3 FB  Neck ROM: full  no difficulty expected  Dental - normal exam     Pulmonary - normal exam   Cardiovascular - normal exam    (+) hypertension,       Neuro/Psych  GI/Hepatic/Renal/Endo    (+)   renal disease stones, diabetes mellitus type 2,     Musculoskeletal     Abdominal  - normal exam   Substance History      OB/GYN          Other                        Anesthesia Plan    ASA 2     general     intravenous induction     Anesthetic plan, all risks, benefits, and alternatives have been provided, discussed and informed consent has been obtained with: patient.

## 2020-02-25 NOTE — ANESTHESIA PROCEDURE NOTES
Airway  Urgency: elective    Date/Time: 2/25/2020 12:07 PM    General Information and Staff    Patient location during procedure: OR  Anesthesiologist: Placido Schrader MD    Indications and Patient Condition    Preoxygenated: yes      Final Airway Details  Final airway type: endotracheal airway      Successful airway: ETT  Cuffed: yes   Successful intubation technique: direct laryngoscopy  Endotracheal tube insertion site: oral  Blade: Valerie  Blade size: 3  ETT size (mm): 7.5  Placement verified by: chest auscultation   Number of attempts at approach: 1

## 2020-02-25 NOTE — ANESTHESIA POSTPROCEDURE EVALUATION
Patient: Sherita Parada    Procedure Summary     Date:  02/25/20 Room / Location:  [facility] OR 01 / [facility] MAIN OR    Anesthesia Start:  1200 Anesthesia Stop:  1245    Procedure:  URETEROSCOPY  LASER LITHOTRIPSY STONE BASKET EXTRACTION ON THE LEFT WITH STENT, CYSTOSCOPY (Left ) Diagnosis:      Surgeon:  Chemo Ramsey MD Provider:  Placido Schrader MD    Anesthesia Type:  general ASA Status:  2          Anesthesia Type: general    Vitals  Vitals Value Taken Time   /69 2/25/2020  1:15 PM   Temp 36.4 °C (97.6 °F) 2/25/2020 12:43 PM   Pulse 63 2/25/2020  1:21 PM   Resp 12 2/25/2020  1:00 PM   SpO2 100 % 2/25/2020  1:21 PM   Vitals shown include unvalidated device data.        Post Anesthesia Care and Evaluation    Patient location during evaluation: PACU  Patient participation: complete - patient participated  Level of consciousness: awake and alert  Pain management: adequate  Airway patency: patent  Anesthetic complications: No anesthetic complications    Cardiovascular status: acceptable  Respiratory status: acceptable  Hydration status: acceptable    Comments: --------------------            02/25/20               1300     --------------------   BP:       101/62     Pulse:      66       Resp:       12       Temp:                SpO2:      100%     --------------------

## 2020-02-25 NOTE — H&P
FIRST UROLOGY CONSULT      Patient Identification:  NAME:  Sherita Parada  Age:  60 y.o.   Sex:  female   :  1960   MRN:  [id number]       Chief complaint: L ureteral stone.      History of present illness:  S/p L ureteral stone pyelo. Completed antibx, stent placed. For cysto L ureteroscopy stone ext stent exchange.        Past medical history:  Past Medical History:   Diagnosis Date   • Diabetes mellitus (CMS/HCC)     H/O... NON-COMPLIANT WITH MEDS   • Hypertension     H/O..  NON-COMPLIANT W/MEDS   • Kidney stone    • Pyelonephritis 2020    TREATED        Past surgical history:  Past Surgical History:   Procedure Laterality Date   • APPENDECTOMY     • BACK SURGERY     • CYSTOSCOPY W/ URETERAL STENT PLACEMENT Left 2020    Procedure: LT. CYSTO STENT;  Surgeon: Chemo Ramsey MD;  Location: [facility];  Service: Urology;  Laterality: Left;   • KNEE SURGERY         Allergies:  Patient has no known allergies.    Home medications:  No medications prior to admission.        Hospital medications:    levoFLOXacin 500 mg Intravenous Once            Family history:  Family History   Problem Relation Age of Onset   • Diabetes Mother    • Diabetes Father    • Heart disease Father    • COPD Father    • Malig Hyperthermia Neg Hx        Social history:  Social History     Tobacco Use   • Smoking status: Former Smoker     Packs/day: 1.50     Years: 30.00     Pack years: 45.00     Types: Cigarettes     Last attempt to quit: 2008     Years since quittin.0   • Smokeless tobacco: Never Used   Substance Use Topics   • Alcohol use: Never     Frequency: Never   • Drug use: Never       Review of systems:      Positive for:  Ureteral stone.    Negative for:  Fever.      Objective:  TMax 24 hours:   No data recorded.      Vitals Ranges:        Intake/Output Last 3 shifts:  No intake/output data recorded.     Physical Exam:    General Appearance:    Alert, cooperative, NAD   HEENT:    No trauma,  pupils reactive, hearing intact   Back:     No CVA tenderness   Lungs:     Respirations unlabored, no wheezing    Heart:    RRR.   Abdomen:     Soft, NDNT, no masses, no guarding   :    Pelvic not performed, bladder non distended and non tender   Extremities:   No edema.   Lymphatic:   No neck or groin LAD   Skin:   No bleeding, bruising or rashes   Neuro/Psych:   Orientation intact, mood/affect pleasant, no focal findings       Results review:   I reviewed the patient's new clinical results.    Data review:  Lab Results (last 24 hours)     Procedure Component Value Units Date/Time    POC Glucose Once [[id number]]  (Abnormal) Collected:  02/25/20 0952    Specimen:  Blood Updated:  02/25/20 0957     Glucose 185 mg/dL            Imaging:  Imaging Results (Last 24 Hours)     ** No results found for the last 24 hours. **             Assessment:       * No active hospital problems. *    L ureteral stone.      Plan:     Cysto L ureteroscopy stone ext stent exchange.  R/B d/w pt.      Chemo Ramsey MD  02/25/20  9:59 AM

## 2020-02-25 NOTE — OP NOTE
PREOPERATIVE DIAGNOSIS: Left renal stone.    POSTOPERATIVE DIAGNOSIS: Same    PROCEDURE: Cysto left ureteroscopy laser lithotripsy stone extraction stent exchange.    SURGEON:  Chemo Ramsey MD    ASSISTANT: None    ANESTHESIA: General    EBL: None    DRAINS: 4.8 Namibian by 26 cm double-J ureteral stent.    COMPLICATIONS: None    FINDINGS: 5 mm stone lower pole left kidney.    INDICATIONS FOR PROCEDURE: History of left ureteral stone with UTI.  Patient has completed her antibiotics and she had a stent placed 10 days prior.  She presents today for cystoscopy left ureteroscopy stone extraction stent placement.  Risk and benefits were explained include but not limited to bleeding, infection, damage to kidney and ureter.  Patient consented to the procedure.    DESCRIPTION OF PROCEDURE: After receiving antibiotics was taken to the cystoscopy suite underwent a general anesthesia.  After adequate anesthesia was obtained she was placed in dorsal lithotomy position.  Her groin was then prepped and draped in a sterile fashion.  A 21 Namibian cystoscope was introduced into the urethra and into the bladder.  I was able to visualize a stent.  I used a flexible grasper to bring the stent out of the meatus.  I placed a guidewire through the stent into the left renal pelvis.  The stent was removed.  I then placed a semirigid ureteroscope in the ureter and drove it up to the UPJ and down slowly.  It appeared that stone had popped into the lower pole of the kidney.  Then placed a second guidewire and then I placed a flexible ureteroscope over the second guidewire into the renal pelvis.  The second guidewire was removed.  I localized the stone in the lower pole the left kidney.  I used a 200 holmium laser to fragment the stone multiple small pieces.  I used a circular basket to remove the largest piece and passed off table specimen.    I then placed a 21 Namibian cystoscope back in the bladder and I placed 4.8 Namibian by 26 cm double-J  ureteral stent over the guidewire Seldinger technique.  There was good coil noted in the left renal pelvis which was confirmed by fluoroscopy and good coil in the bladder confirmed by cystoscopy.  Bladder was drained cystoscope was removed string was left in place.  She was extubated taken recovery in satisfactory condition.  She tolerated the procedure well.

## 2020-03-12 LAB
COD CRY STONE QL IR: 45 %
COLOR STONE: NORMAL
COM CRY STONE QL IR: 50 %
COMPN STONE: NORMAL
HYDROXYAPATITE: 5 %
LABORATORY COMMENT REPORT: NORMAL
LABORATORY COMMENT REPORT: NORMAL
Lab: NORMAL
Lab: NORMAL
PHOTO: NORMAL
SIZE STONE: NORMAL MM
SPECIMEN SOURCE: NORMAL
WT STONE: 4 MG

## 2021-03-22 ENCOUNTER — BULK ORDERING (OUTPATIENT)
Dept: CASE MANAGEMENT | Facility: OTHER | Age: 61
End: 2021-03-22

## 2021-03-22 DIAGNOSIS — Z23 IMMUNIZATION DUE: ICD-10-CM

## 2022-07-26 ENCOUNTER — TRANSCRIBE ORDERS (OUTPATIENT)
Dept: ADMINISTRATIVE | Facility: HOSPITAL | Age: 62
End: 2022-07-26

## 2022-07-26 ENCOUNTER — LAB (OUTPATIENT)
Dept: LAB | Facility: HOSPITAL | Age: 62
End: 2022-07-26

## 2022-07-26 DIAGNOSIS — Z01.818 OTHER SPECIFIED PRE-OPERATIVE EXAMINATION: Primary | ICD-10-CM

## 2022-07-26 DIAGNOSIS — Z01.818 OTHER SPECIFIED PRE-OPERATIVE EXAMINATION: ICD-10-CM

## 2022-07-26 LAB — SARS-COV-2 ORF1AB RESP QL NAA+PROBE: NOT DETECTED

## 2022-07-26 PROCEDURE — U0004 COV-19 TEST NON-CDC HGH THRU: HCPCS

## 2022-08-01 ENCOUNTER — LAB (OUTPATIENT)
Dept: LAB | Facility: HOSPITAL | Age: 62
End: 2022-08-01

## 2022-08-01 ENCOUNTER — TRANSCRIBE ORDERS (OUTPATIENT)
Dept: CARDIOLOGY | Facility: HOSPITAL | Age: 62
End: 2022-08-01

## 2022-08-01 ENCOUNTER — HOSPITAL ENCOUNTER (OUTPATIENT)
Dept: CARDIOLOGY | Facility: HOSPITAL | Age: 62
Discharge: HOME | End: 2022-08-01

## 2022-08-01 ENCOUNTER — TRANSCRIBE ORDERS (OUTPATIENT)
Dept: ADMINISTRATIVE | Facility: HOSPITAL | Age: 62
End: 2022-08-01

## 2022-08-01 DIAGNOSIS — Z01.818 OTHER SPECIFIED PRE-OPERATIVE EXAMINATION: ICD-10-CM

## 2022-08-01 DIAGNOSIS — Z01.811 PRE-OP CHEST EXAM: Primary | ICD-10-CM

## 2022-08-01 DIAGNOSIS — Z01.811 PRE-OP CHEST EXAM: ICD-10-CM

## 2022-08-01 LAB
ALBUMIN SERPL-MCNC: 4.6 G/DL (ref 3.5–5.2)
ALBUMIN/GLOB SERPL: 1.5 G/DL
ALP SERPL-CCNC: 106 U/L (ref 39–117)
ALT SERPL W P-5'-P-CCNC: 25 U/L (ref 1–33)
ANION GAP SERPL CALCULATED.3IONS-SCNC: 10.1 MMOL/L (ref 5–15)
AST SERPL-CCNC: 23 U/L (ref 1–32)
BACTERIA UR QL AUTO: ABNORMAL /HPF
BASOPHILS # BLD AUTO: 0.05 10*3/MM3 (ref 0–0.2)
BASOPHILS NFR BLD AUTO: 0.9 % (ref 0–1.5)
BILIRUB SERPL-MCNC: 0.5 MG/DL (ref 0–1.2)
BILIRUB UR QL STRIP: NEGATIVE
BUN SERPL-MCNC: 10 MG/DL (ref 8–23)
BUN/CREAT SERPL: 12.7 (ref 7–25)
CALCIUM SPEC-SCNC: 9.3 MG/DL (ref 8.6–10.5)
CHLORIDE SERPL-SCNC: 102 MMOL/L (ref 98–107)
CLARITY UR: ABNORMAL
CO2 SERPL-SCNC: 26.9 MMOL/L (ref 22–29)
COLOR UR: YELLOW
CREAT SERPL-MCNC: 0.79 MG/DL (ref 0.57–1)
DEPRECATED RDW RBC AUTO: 39.2 FL (ref 37–54)
EGFRCR SERPLBLD CKD-EPI 2021: 85.2 ML/MIN/1.73
EOSINOPHIL # BLD AUTO: 0.12 10*3/MM3 (ref 0–0.4)
EOSINOPHIL NFR BLD AUTO: 2.2 % (ref 0.3–6.2)
ERYTHROCYTE [DISTWIDTH] IN BLOOD BY AUTOMATED COUNT: 13 % (ref 12.3–15.4)
GLOBULIN UR ELPH-MCNC: 3 GM/DL
GLUCOSE SERPL-MCNC: 283 MG/DL (ref 65–99)
GLUCOSE UR STRIP-MCNC: ABNORMAL MG/DL
HCT VFR BLD AUTO: 47.7 % (ref 34–46.6)
HGB BLD-MCNC: 15.1 G/DL (ref 12–15.9)
HGB UR QL STRIP.AUTO: ABNORMAL
HYALINE CASTS UR QL AUTO: ABNORMAL /LPF
IMM GRANULOCYTES # BLD AUTO: 0.01 10*3/MM3 (ref 0–0.05)
IMM GRANULOCYTES NFR BLD AUTO: 0.2 % (ref 0–0.5)
KETONES UR QL STRIP: NEGATIVE
LEUKOCYTE ESTERASE UR QL STRIP.AUTO: ABNORMAL
LYMPHOCYTES # BLD AUTO: 1.3 10*3/MM3 (ref 0.7–3.1)
LYMPHOCYTES NFR BLD AUTO: 23.4 % (ref 19.6–45.3)
MCH RBC QN AUTO: 26.4 PG (ref 26.6–33)
MCHC RBC AUTO-ENTMCNC: 31.7 G/DL (ref 31.5–35.7)
MCV RBC AUTO: 83.2 FL (ref 79–97)
MONOCYTES # BLD AUTO: 0.52 10*3/MM3 (ref 0.1–0.9)
MONOCYTES NFR BLD AUTO: 9.4 % (ref 5–12)
NEUTROPHILS NFR BLD AUTO: 3.56 10*3/MM3 (ref 1.7–7)
NEUTROPHILS NFR BLD AUTO: 63.9 % (ref 42.7–76)
NITRITE UR QL STRIP: NEGATIVE
NRBC BLD AUTO-RTO: 0 /100 WBC (ref 0–0.2)
PH UR STRIP.AUTO: 5.5 [PH] (ref 5–8)
PLATELET # BLD AUTO: 236 10*3/MM3 (ref 140–450)
PMV BLD AUTO: 10.6 FL (ref 6–12)
POTASSIUM SERPL-SCNC: 5 MMOL/L (ref 3.5–5.2)
PROT SERPL-MCNC: 7.6 G/DL (ref 6–8.5)
PROT UR QL STRIP: ABNORMAL
RBC # BLD AUTO: 5.73 10*6/MM3 (ref 3.77–5.28)
RBC # UR STRIP: ABNORMAL /HPF
REF LAB TEST METHOD: ABNORMAL
SARS-COV-2 ORF1AB RESP QL NAA+PROBE: NOT DETECTED
SODIUM SERPL-SCNC: 139 MMOL/L (ref 136–145)
SP GR UR STRIP: >=1.03 (ref 1–1.03)
SQUAMOUS #/AREA URNS HPF: ABNORMAL /HPF
UROBILINOGEN UR QL STRIP: ABNORMAL
WBC # UR STRIP: ABNORMAL /HPF
WBC NRBC COR # BLD: 5.56 10*3/MM3 (ref 3.4–10.8)

## 2022-08-01 PROCEDURE — 85025 COMPLETE CBC W/AUTO DIFF WBC: CPT

## 2022-08-01 PROCEDURE — C9803 HOPD COVID-19 SPEC COLLECT: HCPCS

## 2022-08-01 PROCEDURE — 81001 URINALYSIS AUTO W/SCOPE: CPT

## 2022-08-01 PROCEDURE — U0004 COV-19 TEST NON-CDC HGH THRU: HCPCS | Performed by: ORTHOPAEDIC SURGERY

## 2022-08-01 PROCEDURE — 80053 COMPREHEN METABOLIC PANEL: CPT

## 2022-08-01 PROCEDURE — 36415 COLL VENOUS BLD VENIPUNCTURE: CPT

## 2022-08-02 RX ORDER — POLYETHYLENE GLYCOL 3350 17 G/17G
17 POWDER, FOR SOLUTION ORAL
COMMUNITY
Start: 2022-04-25

## 2022-08-02 RX ORDER — OMEPRAZOLE 20 MG/1
CAPSULE, DELAYED RELEASE ORAL
COMMUNITY
Start: 2022-07-26

## 2022-08-02 RX ORDER — OXYCODONE HYDROCHLORIDE AND ACETAMINOPHEN 5; 325 MG/1; MG/1
TABLET ORAL
COMMUNITY
Start: 2022-04-26 | End: 2022-08-03 | Stop reason: HOSPADM

## 2022-08-02 RX ORDER — PIOGLITAZONEHYDROCHLORIDE 30 MG/1
TABLET ORAL
COMMUNITY
Start: 2022-07-26

## 2022-08-02 RX ORDER — ROSUVASTATIN CALCIUM 10 MG/1
TABLET, COATED ORAL
COMMUNITY
Start: 2022-07-26

## 2022-08-02 RX ORDER — ONDANSETRON 4 MG/1
4 TABLET, FILM COATED ORAL
COMMUNITY
Start: 2022-04-25

## 2022-08-03 ENCOUNTER — HOSPITAL ENCOUNTER (OUTPATIENT)
Facility: HOSPITAL | Age: 62
Setting detail: HOSPITAL OUTPATIENT SURGERY
Discharge: HOME | End: 2022-08-03
Attending: ORTHOPAEDIC SURGERY | Admitting: ORTHOPAEDIC SURGERY

## 2022-08-03 ENCOUNTER — ANESTHESIA (OUTPATIENT)
Dept: PERIOP | Facility: HOSPITAL | Age: 62
End: 2022-08-03

## 2022-08-03 ENCOUNTER — ANESTHESIA EVENT (OUTPATIENT)
Dept: PERIOP | Facility: HOSPITAL | Age: 62
End: 2022-08-03

## 2022-08-03 VITALS
BODY MASS INDEX: 22.15 KG/M2 | HEIGHT: 63 IN | RESPIRATION RATE: 15 BRPM | WEIGHT: 125 LBS | TEMPERATURE: 97.5 F | HEART RATE: 79 BPM | OXYGEN SATURATION: 99 % | DIASTOLIC BLOOD PRESSURE: 82 MMHG | SYSTOLIC BLOOD PRESSURE: 141 MMHG

## 2022-08-03 LAB
GLUCOSE BLDC GLUCOMTR-MCNC: 150 MG/DL (ref 70–130)
GLUCOSE BLDC GLUCOMTR-MCNC: 232 MG/DL (ref 70–130)

## 2022-08-03 PROCEDURE — 25010000002 KETOROLAC TROMETHAMINE PER 15 MG: Performed by: ANESTHESIOLOGY

## 2022-08-03 PROCEDURE — 25010000002 CEFAZOLIN IN DEXTROSE 2-4 GM/100ML-% SOLUTION: Performed by: ORTHOPAEDIC SURGERY

## 2022-08-03 PROCEDURE — 25010000002 FENTANYL CITRATE (PF) 50 MCG/ML SOLUTION: Performed by: STUDENT IN AN ORGANIZED HEALTH CARE EDUCATION/TRAINING PROGRAM

## 2022-08-03 PROCEDURE — 25010000002 ONDANSETRON PER 1 MG: Performed by: ANESTHESIOLOGY

## 2022-08-03 PROCEDURE — 25010000002 PROPOFOL 10 MG/ML EMULSION: Performed by: STUDENT IN AN ORGANIZED HEALTH CARE EDUCATION/TRAINING PROGRAM

## 2022-08-03 PROCEDURE — 82962 GLUCOSE BLOOD TEST: CPT

## 2022-08-03 PROCEDURE — C1713 ANCHOR/SCREW BN/BN,TIS/BN: HCPCS | Performed by: ORTHOPAEDIC SURGERY

## 2022-08-03 DEVICE — SUT/ANCH CORKSCREW/FT2  W/3NO2FW 5.5X16.3MM: Type: IMPLANTABLE DEVICE | Site: LEG | Status: FUNCTIONAL

## 2022-08-03 RX ORDER — LIDOCAINE HYDROCHLORIDE 10 MG/ML
0.5 INJECTION, SOLUTION EPIDURAL; INFILTRATION; INTRACAUDAL; PERINEURAL ONCE AS NEEDED
Status: DISCONTINUED | OUTPATIENT
Start: 2022-08-03 | End: 2022-08-03 | Stop reason: HOSPADM

## 2022-08-03 RX ORDER — SODIUM CHLORIDE, SODIUM LACTATE, POTASSIUM CHLORIDE, CALCIUM CHLORIDE 600; 310; 30; 20 MG/100ML; MG/100ML; MG/100ML; MG/100ML
9 INJECTION, SOLUTION INTRAVENOUS CONTINUOUS
Status: DISCONTINUED | OUTPATIENT
Start: 2022-08-03 | End: 2022-08-03 | Stop reason: HOSPADM

## 2022-08-03 RX ORDER — LIDOCAINE HYDROCHLORIDE 20 MG/ML
INJECTION, SOLUTION INFILTRATION; PERINEURAL AS NEEDED
Status: DISCONTINUED | OUTPATIENT
Start: 2022-08-03 | End: 2022-08-03 | Stop reason: SURG

## 2022-08-03 RX ORDER — ONDANSETRON 2 MG/ML
4 INJECTION INTRAMUSCULAR; INTRAVENOUS ONCE AS NEEDED
Status: DISCONTINUED | OUTPATIENT
Start: 2022-08-03 | End: 2022-08-03 | Stop reason: HOSPADM

## 2022-08-03 RX ORDER — SODIUM CHLORIDE 0.9 % (FLUSH) 0.9 %
3-10 SYRINGE (ML) INJECTION AS NEEDED
Status: DISCONTINUED | OUTPATIENT
Start: 2022-08-03 | End: 2022-08-03 | Stop reason: HOSPADM

## 2022-08-03 RX ORDER — EPHEDRINE SULFATE 50 MG/ML
5 INJECTION, SOLUTION INTRAVENOUS ONCE AS NEEDED
Status: DISCONTINUED | OUTPATIENT
Start: 2022-08-03 | End: 2022-08-03 | Stop reason: HOSPADM

## 2022-08-03 RX ORDER — MAGNESIUM HYDROXIDE 1200 MG/15ML
LIQUID ORAL AS NEEDED
Status: DISCONTINUED | OUTPATIENT
Start: 2022-08-03 | End: 2022-08-03 | Stop reason: HOSPADM

## 2022-08-03 RX ORDER — DIPHENHYDRAMINE HYDROCHLORIDE 50 MG/ML
12.5 INJECTION INTRAMUSCULAR; INTRAVENOUS
Status: DISCONTINUED | OUTPATIENT
Start: 2022-08-03 | End: 2022-08-03 | Stop reason: HOSPADM

## 2022-08-03 RX ORDER — FENTANYL CITRATE 50 UG/ML
INJECTION, SOLUTION INTRAMUSCULAR; INTRAVENOUS AS NEEDED
Status: DISCONTINUED | OUTPATIENT
Start: 2022-08-03 | End: 2022-08-03 | Stop reason: SURG

## 2022-08-03 RX ORDER — FLUMAZENIL 0.1 MG/ML
0.2 INJECTION INTRAVENOUS AS NEEDED
Status: DISCONTINUED | OUTPATIENT
Start: 2022-08-03 | End: 2022-08-03 | Stop reason: HOSPADM

## 2022-08-03 RX ORDER — HYDRALAZINE HYDROCHLORIDE 20 MG/ML
5 INJECTION INTRAMUSCULAR; INTRAVENOUS
Status: DISCONTINUED | OUTPATIENT
Start: 2022-08-03 | End: 2022-08-03 | Stop reason: HOSPADM

## 2022-08-03 RX ORDER — FAMOTIDINE 10 MG/ML
20 INJECTION, SOLUTION INTRAVENOUS ONCE
Status: COMPLETED | OUTPATIENT
Start: 2022-08-03 | End: 2022-08-03

## 2022-08-03 RX ORDER — OXYCODONE HYDROCHLORIDE AND ACETAMINOPHEN 5; 325 MG/1; MG/1
1-2 TABLET ORAL EVERY 4 HOURS PRN
Qty: 40 TABLET | Refills: 0 | Status: SHIPPED | OUTPATIENT
Start: 2022-08-03

## 2022-08-03 RX ORDER — EPHEDRINE SULFATE 50 MG/ML
INJECTION, SOLUTION INTRAVENOUS AS NEEDED
Status: DISCONTINUED | OUTPATIENT
Start: 2022-08-03 | End: 2022-08-03 | Stop reason: SURG

## 2022-08-03 RX ORDER — PROMETHAZINE HYDROCHLORIDE 25 MG/1
25 TABLET ORAL ONCE AS NEEDED
Status: DISCONTINUED | OUTPATIENT
Start: 2022-08-03 | End: 2022-08-03 | Stop reason: HOSPADM

## 2022-08-03 RX ORDER — OXYCODONE AND ACETAMINOPHEN 7.5; 325 MG/1; MG/1
1 TABLET ORAL EVERY 4 HOURS PRN
Status: DISCONTINUED | OUTPATIENT
Start: 2022-08-03 | End: 2022-08-03 | Stop reason: HOSPADM

## 2022-08-03 RX ORDER — HYDROMORPHONE HYDROCHLORIDE 1 MG/ML
0.5 INJECTION, SOLUTION INTRAMUSCULAR; INTRAVENOUS; SUBCUTANEOUS
Status: DISCONTINUED | OUTPATIENT
Start: 2022-08-03 | End: 2022-08-03 | Stop reason: HOSPADM

## 2022-08-03 RX ORDER — NALOXONE HCL 0.4 MG/ML
0.2 VIAL (ML) INJECTION AS NEEDED
Status: DISCONTINUED | OUTPATIENT
Start: 2022-08-03 | End: 2022-08-03 | Stop reason: HOSPADM

## 2022-08-03 RX ORDER — LABETALOL HYDROCHLORIDE 5 MG/ML
5 INJECTION, SOLUTION INTRAVENOUS
Status: DISCONTINUED | OUTPATIENT
Start: 2022-08-03 | End: 2022-08-03 | Stop reason: HOSPADM

## 2022-08-03 RX ORDER — DIPHENHYDRAMINE HCL 25 MG
25 CAPSULE ORAL
Status: DISCONTINUED | OUTPATIENT
Start: 2022-08-03 | End: 2022-08-03 | Stop reason: HOSPADM

## 2022-08-03 RX ORDER — PROMETHAZINE HYDROCHLORIDE 25 MG/1
25 SUPPOSITORY RECTAL ONCE AS NEEDED
Status: DISCONTINUED | OUTPATIENT
Start: 2022-08-03 | End: 2022-08-03 | Stop reason: HOSPADM

## 2022-08-03 RX ORDER — HYDROCODONE BITARTRATE AND ACETAMINOPHEN 7.5; 325 MG/1; MG/1
1 TABLET ORAL ONCE AS NEEDED
Status: COMPLETED | OUTPATIENT
Start: 2022-08-03 | End: 2022-08-03

## 2022-08-03 RX ORDER — FENTANYL CITRATE 50 UG/ML
50 INJECTION, SOLUTION INTRAMUSCULAR; INTRAVENOUS
Status: DISCONTINUED | OUTPATIENT
Start: 2022-08-03 | End: 2022-08-03 | Stop reason: HOSPADM

## 2022-08-03 RX ORDER — KETOROLAC TROMETHAMINE 30 MG/ML
INJECTION, SOLUTION INTRAMUSCULAR; INTRAVENOUS AS NEEDED
Status: DISCONTINUED | OUTPATIENT
Start: 2022-08-03 | End: 2022-08-03 | Stop reason: SURG

## 2022-08-03 RX ORDER — MIDAZOLAM HYDROCHLORIDE 1 MG/ML
1 INJECTION INTRAMUSCULAR; INTRAVENOUS
Status: DISCONTINUED | OUTPATIENT
Start: 2022-08-03 | End: 2022-08-03 | Stop reason: HOSPADM

## 2022-08-03 RX ORDER — SODIUM CHLORIDE 0.9 % (FLUSH) 0.9 %
3 SYRINGE (ML) INJECTION EVERY 12 HOURS SCHEDULED
Status: DISCONTINUED | OUTPATIENT
Start: 2022-08-03 | End: 2022-08-03 | Stop reason: HOSPADM

## 2022-08-03 RX ORDER — ONDANSETRON 2 MG/ML
INJECTION INTRAMUSCULAR; INTRAVENOUS AS NEEDED
Status: DISCONTINUED | OUTPATIENT
Start: 2022-08-03 | End: 2022-08-03 | Stop reason: SURG

## 2022-08-03 RX ORDER — CEFAZOLIN SODIUM 2 G/100ML
2 INJECTION, SOLUTION INTRAVENOUS ONCE
Status: COMPLETED | OUTPATIENT
Start: 2022-08-03 | End: 2022-08-03

## 2022-08-03 RX ORDER — PROPOFOL 10 MG/ML
VIAL (ML) INTRAVENOUS AS NEEDED
Status: DISCONTINUED | OUTPATIENT
Start: 2022-08-03 | End: 2022-08-03 | Stop reason: SURG

## 2022-08-03 RX ADMIN — CEFAZOLIN SODIUM 2 G: 2 INJECTION, SOLUTION INTRAVENOUS at 15:40

## 2022-08-03 RX ADMIN — PROPOFOL 200 MG: 10 INJECTION, EMULSION INTRAVENOUS at 15:48

## 2022-08-03 RX ADMIN — EPHEDRINE SULFATE 10 MG: 50 INJECTION INTRAVENOUS at 16:19

## 2022-08-03 RX ADMIN — KETOROLAC TROMETHAMINE 30 MG: 30 INJECTION, SOLUTION INTRAMUSCULAR at 16:29

## 2022-08-03 RX ADMIN — SODIUM CHLORIDE, POTASSIUM CHLORIDE, SODIUM LACTATE AND CALCIUM CHLORIDE 9 ML/HR: 600; 310; 30; 20 INJECTION, SOLUTION INTRAVENOUS at 13:51

## 2022-08-03 RX ADMIN — EPHEDRINE SULFATE 10 MG: 50 INJECTION INTRAVENOUS at 16:03

## 2022-08-03 RX ADMIN — LIDOCAINE HYDROCHLORIDE 60 MG: 20 INJECTION, SOLUTION INFILTRATION; PERINEURAL at 15:48

## 2022-08-03 RX ADMIN — ONDANSETRON 4 MG: 2 INJECTION INTRAMUSCULAR; INTRAVENOUS at 16:14

## 2022-08-03 RX ADMIN — FENTANYL CITRATE 100 MCG: 50 INJECTION INTRAMUSCULAR; INTRAVENOUS at 15:48

## 2022-08-03 RX ADMIN — HYDROCODONE BITARTRATE AND ACETAMINOPHEN 1 TABLET: 7.5; 325 TABLET ORAL at 17:24

## 2022-08-03 RX ADMIN — FAMOTIDINE 20 MG: 10 INJECTION, SOLUTION INTRAVENOUS at 14:35

## 2022-08-03 NOTE — OP NOTE
EXCISION MASS LOWER EXTREMITY  Procedure Note    Sherita Parada  8/3/2022    Pre-op Diagnosis:   1.  Right tibial tuberosity exostosis  2.   3.     Post-op Diagnosis:     1.  Same  2.   3.     Procedure(s):  1.  Excisional biopsy bony exostosis of tibial tuberosity  2.  Patella tendon repair anchor x1  3.   4.     Surgeon(s):  Adair Cedillo MD    Anesthesia: General  Anesthesiologist: Oziel Mansfield MD; Rubens Burris MD    Staff:   Circulator: Tegan Anderson RN; Rachna Ybarra, MANUEL; Flor Rousseau RN  Scrub Person: Eboni Bourgeois      Drains: None    Estimated Blood Loss: minimal    Specimen: * No orders in the log *    Description of Procedure:   1.  Following duction of anesthesia a tourniquet was placed on the right upper thigh.  The right leg was then held elevated was prepped and draped in a sterile fashion.  The tourniquet was then inflated 250 mmHg pressure.  I created an incision in the midline which was around 5 cm in length over the palpable mass at the patient's tibial tuberosity.  I bluntly divided subcutaneous tissue exposing the mass in the distal patellar tendon.  She was found to have a very sharp bony exostosis in the midportion of the tendon the involved a portion of the attachment of the patellar tendon.  I used a 15 blade to split the fibers of the patellar tendon and carefully dissected both medially and laterally around the sharp bony exostosis.  I then used a combination of an osteotome and rongeur to remove the bony protrusion and to further smooth her tibial tuberosity.  After removing the sharp protruding bone I found that we had achieved a smooth surface but this did undermine a portion of the attachment of the patellar tendon.    2.  The mid third of the patellar tendon was detached as a result of this removing the bony exostosis.  The removal had created a surface of bony cancellous tissue.  I placed a 5.5 mm Arthrex corkscrew suture anchor and this cancellous bone in  the center of the defect we had created in the patellar tendon.  I then passed 2 of the sutures in a horizontal mattress fashion through the fibers of the patellar tendon attaching them tightly to the cancellous bone.  We achieve good repair of the mid third of the patellar tendon.  I then irrigated then performed closure using interrupted 2-0 Vicryl to close subcuticular tissue and a running 3-0 Prolene to close the skin.  She was then placed into a soft sterile dressing and a knee immobilizer.  I will speak to her and her family about protecting her knee and using the knee immobilizer for the next few weeks to prevent any further damage or possible avulsion of the remainder of her patellar tendon.    Findings: See Dictation    Complications: None      Adair Cedillo MD     Date: 8/3/2022  Time: 16:41 EDT

## 2022-08-03 NOTE — ANESTHESIA PREPROCEDURE EVALUATION
Anesthesia Evaluation     Patient summary reviewed   no history of anesthetic complications:  NPO Solid Status: > 8 hours  NPO Liquid Status: > 2 hours           Airway   Mallampati: II  TM distance: >3 FB  Neck ROM: full  no difficulty expected  Dental      Pulmonary     breath sounds clear to auscultation  (-) shortness of breath, recent URI, not a smoker  Cardiovascular   Exercise tolerance: good (4-7 METS)    ECG reviewed  Rhythm: regular  Rate: normal    (+) hypertension poorly controlled,   (-) past MI, dysrhythmias, angina      Neuro/Psych  (+) numbness (RLE foot),    (-) seizures, CVA  GI/Hepatic/Renal/Endo    (+)  GERD,  renal disease (h/o) stones, diabetes mellitus (A1C 11.4) type 2 poorly controlled,   (-)  obesity    Musculoskeletal     (-) neck stiffness  Abdominal    Substance History   (+) drug use (marijuana)     OB/GYN          Other                        Anesthesia Plan    ASA 3     general     intravenous induction     Anesthetic plan, risks, benefits, and alternatives have been provided, discussed and informed consent has been obtained with: patient.    Plan discussed with CRNA.

## 2022-08-03 NOTE — OP NOTE
EXCISION MASS LOWER EXTREMITY  Procedure Note    Sherita Parada  8/3/2022    Pre-op Diagnosis:   1.  Left distal biceps tendon tear  2.   3.     Post-op Diagnosis:     1.  Same  2.   3.     Procedure(s):  1.  Left distal biceps tendon repair  2.   3.   4.     Surgeon(s):  Adair Cedillo MD    Anesthesia: General  No anesthesia staff entered.    Staff:   Circulator: Tegan Anderson RN; Rachna Ybarra RN  Scrub Person: Eboni Bourgeois      Drains: None    Estimated Blood Loss: minimal    Specimen: * No orders in the log *    Description of Procedure: Following induction of anesthesia the patient was placed supine with the left upper extremity placed on the arm table.  A nonsterile tourniquet was placed on the left upper arm.  The arm was then prepped and draped in a sterile fashion and while the arm was held elevated the tourniquet was inflated 200 mmHg pressure.  I first made our anterior incision just distal to the antecubital fossa.  This was an inline incision around 4 cm in length and centered over the radial tuberosity.  I bluntly divided subcutaneous tissue exposing the fascia overlying the flexor musculature.  There were several large veins that were carefully protected.  I bluntly dissected proximally allow me to place a gloved finger underneath the skin.  I was able to palpate the retracted stump of the biceps tendon which appeared to still be held in contact with a portion of the bicipital sheath.  He was found to have 4 to 5 cm of intact sheath attached distally to the radial tuberosity but the majority of the fibers of the tendon had retracted significantly.  I excised the surrounding scar tissue and sheath to expose the distal stump of the biceps tendon.  I then imbricated a #5 Tycron in a baseball stitch type fashion through the distal 2 and half centimeters of the tendon.  While holding tension on the sutures I then used a gloved finger to lyse adhesions to try to mobilize our tendon.  I was able  to reach down to the tuberosity with the elbow flexed at 90 degrees but I repair would be under some tension.    I then placed the forearm into hyper supination and was able to fairly easily palpate the radial tuberosity.  I bluntly dissected down to it and placed blunt retractors both medially and laterally.  With the forearm in supination I used a rongeur to remove the stump of the biceps and soft tissue from the tuberosity.  It was exposed further using a small elevator.  I then used a small pineapple bur to create an indentation at the tuberosity around a centimeter and half in length by centimeter in width.  Again with the forearm in supination I drilled to small holes through this indentation through the posterior cortex of the radius.  We then irrigated extensively.  I then passed a hemostat around the ulnar aspect of the tuberosity until indented the skin on the posterior forearm.  I then made a small, 2 and half centimeter incision overlying the palpable tips of the hemostat.  The fascia overlying the extensor musculature was incised longitudinally and a small elevator was used to expose the posterior cortex of the radius.  Going back to the anterior incision I then used a DivX suture passer to pass the 2 limbs of the #5 Tycron through the 2 drill holes I placed through the opening into the tuberosity.  I treated these 2 sutures through our posterior incision.  I found that at 90 degrees I was able to pull the biceps stump down to the radial tuberosity without much difficulty but there was some mild tension and some separation at around 45 degrees of flexion.  I then irrigated thoroughly our anterior incision and pulled the biceps tendon down to the radial tuberosity and tied over our posterior bone bridge through the posterior incision.  4 square knots were used.  I then closed our anterior incision using interrupted 0 Vicryl in a running 3-0 Prolene.  Our posterior incision was then closed using  interrupted 0 Vicryl in a running 3-0 Prolene.  He was then placed into a soft sterile dressing and a posterior plaster splint with the forearm in supination.  He was then discharged to recovery in good condition.  His prognosis is good.    Findings: See Dictation    Complications: None      Adair Cedillo MD     Date: 8/3/2022  Time: 15:35 EDT

## 2022-08-03 NOTE — ANESTHESIA PROCEDURE NOTES
Airway  Urgency: elective    Date/Time: 8/3/2022 3:52 PM  Airway not difficult    General Information and Staff    Patient location during procedure: OR  Anesthesiologist: Rubens Burris MD    Indications and Patient Condition  Indications for airway management: airway protection    Preoxygenated: yes  MILS maintained throughout  Mask difficulty assessment: 0 - not attempted    Final Airway Details  Final airway type: supraglottic airway      Successful airway: LMA  Size 4    Number of attempts at approach: 1  Assessment: lips, teeth, and gum same as pre-op and atraumatic intubation

## 2022-08-03 NOTE — ANESTHESIA POSTPROCEDURE EVALUATION
"Patient: Sherita Parada    Procedure Summary     Date: 08/03/22 Room / Location:  [facility] OR [address] [place] [address] [facility]    Anesthesia Start: 1542 Anesthesia Stop: 1647    Procedure: EXCISIONAL BIOPSY OF BONY EXOSTOSIS RIGHT TIBIA AND PATELLA TENDON REPAIR  (Right ) Diagnosis:     Surgeons: Adair Cedillo MD Provider: Oziel Mansfield MD    Anesthesia Type: general ASA Status: 3          Anesthesia Type: general    Vitals  Vitals Value Taken Time   /82 08/03/22 1730   Temp 36.4 °C (97.5 °F) 08/03/22 1729   Pulse 87 08/03/22 1731   Resp 14 08/03/22 1729   SpO2 97 % 08/03/22 1731   Vitals shown include unvalidated device data.        Post Anesthesia Care and Evaluation    Patient location during evaluation: PHASE II  Patient participation: complete - patient participated  Level of consciousness: awake  Pain management: adequate    Airway patency: patent  Anesthetic complications: No anesthetic complications    Cardiovascular status: acceptable  Respiratory status: acceptable  Hydration status: acceptable    Comments: /82 (BP Location: Right arm, Patient Position: Sitting)   Pulse 79   Temp 36.4 °C (97.5 °F) (Oral)   Resp 15   Ht 160 cm (63\")   Wt 56.7 kg (125 lb)   SpO2 99%   BMI 22.14 kg/m²       "

## 2022-08-11 NOTE — H&P
HPI  Patient is here for some continued pain from her right knee. Is now been a little over a year since her fall and the bump below her kneecap has not gone away. She says she is constantly bumping it on things and it becomes painful. She is concerned because the skin overlying this bump is becoming thinner and has bled.    Physical Exam  Right knee shows a visible and palpable bony prominence at the tibial tuberosity. There is some bruising of the overlying tissue which is becoming thinned. There is no effusion in her knee function is good.    Assessment / Plan  1. Exostosis of right tibia  M89.8X6: Other specified disorders of bone, lower leg    Patient Instructions  I again discussed with her surgical excision of this painful exostosis. Her  just had a knee scope and I am seeing them both back in 2 weeks. At that time we may pick a date to perform this procedure.

## 2022-09-09 ENCOUNTER — TRANSCRIBE ORDERS (OUTPATIENT)
Dept: ADMINISTRATIVE | Facility: HOSPITAL | Age: 62
End: 2022-09-09

## 2022-09-09 ENCOUNTER — LAB (OUTPATIENT)
Dept: LAB | Facility: HOSPITAL | Age: 62
End: 2022-09-09

## 2022-09-09 DIAGNOSIS — R52 PAIN: Primary | ICD-10-CM

## 2022-09-09 DIAGNOSIS — R52 PAIN: ICD-10-CM

## 2022-09-09 PROCEDURE — 87205 SMEAR GRAM STAIN: CPT

## 2022-09-09 PROCEDURE — 87070 CULTURE OTHR SPECIMN AEROBIC: CPT

## 2022-09-11 LAB
BACTERIA SPEC AEROBE CULT: NORMAL
GRAM STN SPEC: NORMAL
GRAM STN SPEC: NORMAL

## 2022-09-22 ENCOUNTER — LAB (OUTPATIENT)
Dept: LAB | Facility: HOSPITAL | Age: 62
End: 2022-09-22

## 2022-09-22 ENCOUNTER — TRANSCRIBE ORDERS (OUTPATIENT)
Dept: ADMINISTRATIVE | Facility: HOSPITAL | Age: 62
End: 2022-09-22

## 2022-09-22 DIAGNOSIS — Z01.818 PRE-OP EXAM: ICD-10-CM

## 2022-09-22 DIAGNOSIS — Z01.818 PRE-OP EXAM: Primary | ICD-10-CM

## 2022-09-22 LAB
ALBUMIN SERPL-MCNC: 4.3 G/DL (ref 3.5–5.2)
ALBUMIN/GLOB SERPL: 1.3 G/DL
ALP SERPL-CCNC: 108 U/L (ref 39–117)
ALT SERPL W P-5'-P-CCNC: 23 U/L (ref 1–33)
ANION GAP SERPL CALCULATED.3IONS-SCNC: 10.2 MMOL/L (ref 5–15)
AST SERPL-CCNC: 21 U/L (ref 1–32)
BASOPHILS # BLD AUTO: 0.04 10*3/MM3 (ref 0–0.2)
BASOPHILS NFR BLD AUTO: 0.7 % (ref 0–1.5)
BILIRUB SERPL-MCNC: 0.6 MG/DL (ref 0–1.2)
BUN SERPL-MCNC: 11 MG/DL (ref 8–23)
BUN/CREAT SERPL: 13.8 (ref 7–25)
CALCIUM SPEC-SCNC: 9.6 MG/DL (ref 8.6–10.5)
CHLORIDE SERPL-SCNC: 101 MMOL/L (ref 98–107)
CO2 SERPL-SCNC: 25.8 MMOL/L (ref 22–29)
CREAT SERPL-MCNC: 0.8 MG/DL (ref 0.57–1)
DEPRECATED RDW RBC AUTO: 37.7 FL (ref 37–54)
EGFRCR SERPLBLD CKD-EPI 2021: 83.9 ML/MIN/1.73
EOSINOPHIL # BLD AUTO: 0.16 10*3/MM3 (ref 0–0.4)
EOSINOPHIL NFR BLD AUTO: 2.8 % (ref 0.3–6.2)
ERYTHROCYTE [DISTWIDTH] IN BLOOD BY AUTOMATED COUNT: 12.9 % (ref 12.3–15.4)
GLOBULIN UR ELPH-MCNC: 3.2 GM/DL
GLUCOSE SERPL-MCNC: 227 MG/DL (ref 65–99)
HCT VFR BLD AUTO: 41.9 % (ref 34–46.6)
HGB BLD-MCNC: 13.9 G/DL (ref 12–15.9)
IMM GRANULOCYTES # BLD AUTO: 0.02 10*3/MM3 (ref 0–0.05)
IMM GRANULOCYTES NFR BLD AUTO: 0.4 % (ref 0–0.5)
LYMPHOCYTES # BLD AUTO: 1.51 10*3/MM3 (ref 0.7–3.1)
LYMPHOCYTES NFR BLD AUTO: 26.9 % (ref 19.6–45.3)
MCH RBC QN AUTO: 26.5 PG (ref 26.6–33)
MCHC RBC AUTO-ENTMCNC: 33.2 G/DL (ref 31.5–35.7)
MCV RBC AUTO: 80 FL (ref 79–97)
MONOCYTES # BLD AUTO: 0.53 10*3/MM3 (ref 0.1–0.9)
MONOCYTES NFR BLD AUTO: 9.4 % (ref 5–12)
NEUTROPHILS NFR BLD AUTO: 3.36 10*3/MM3 (ref 1.7–7)
NEUTROPHILS NFR BLD AUTO: 59.8 % (ref 42.7–76)
NRBC BLD AUTO-RTO: 0 /100 WBC (ref 0–0.2)
PLATELET # BLD AUTO: 269 10*3/MM3 (ref 140–450)
PMV BLD AUTO: 10.4 FL (ref 6–12)
POTASSIUM SERPL-SCNC: 3.9 MMOL/L (ref 3.5–5.2)
PROT SERPL-MCNC: 7.5 G/DL (ref 6–8.5)
RBC # BLD AUTO: 5.24 10*6/MM3 (ref 3.77–5.28)
SODIUM SERPL-SCNC: 137 MMOL/L (ref 136–145)
WBC NRBC COR # BLD: 5.62 10*3/MM3 (ref 3.4–10.8)

## 2022-09-22 PROCEDURE — 36415 COLL VENOUS BLD VENIPUNCTURE: CPT

## 2022-09-22 PROCEDURE — 80053 COMPREHEN METABOLIC PANEL: CPT

## 2022-09-22 PROCEDURE — 85025 COMPLETE CBC W/AUTO DIFF WBC: CPT

## 2022-09-23 ENCOUNTER — HOSPITAL ENCOUNTER (OUTPATIENT)
Facility: HOSPITAL | Age: 62
Setting detail: HOSPITAL OUTPATIENT SURGERY
Discharge: HOME | End: 2022-09-23
Attending: ORTHOPAEDIC SURGERY | Admitting: ORTHOPAEDIC SURGERY

## 2022-09-23 ENCOUNTER — ANESTHESIA EVENT (OUTPATIENT)
Dept: PERIOP | Facility: HOSPITAL | Age: 62
End: 2022-09-23

## 2022-09-23 ENCOUNTER — ANESTHESIA (OUTPATIENT)
Dept: PERIOP | Facility: HOSPITAL | Age: 62
End: 2022-09-23

## 2022-09-23 VITALS
RESPIRATION RATE: 15 BRPM | OXYGEN SATURATION: 96 % | HEIGHT: 63 IN | TEMPERATURE: 97.4 F | BODY MASS INDEX: 24.14 KG/M2 | DIASTOLIC BLOOD PRESSURE: 81 MMHG | HEART RATE: 64 BPM | SYSTOLIC BLOOD PRESSURE: 153 MMHG | WEIGHT: 136.24 LBS

## 2022-09-23 DIAGNOSIS — L24.A9 WOUND DRAINAGE: ICD-10-CM

## 2022-09-23 LAB — GLUCOSE BLDC GLUCOMTR-MCNC: 214 MG/DL (ref 70–130)

## 2022-09-23 PROCEDURE — 87075 CULTR BACTERIA EXCEPT BLOOD: CPT | Performed by: ORTHOPAEDIC SURGERY

## 2022-09-23 PROCEDURE — 87205 SMEAR GRAM STAIN: CPT | Performed by: ORTHOPAEDIC SURGERY

## 2022-09-23 PROCEDURE — 25010000002 KETOROLAC TROMETHAMINE PER 15 MG: Performed by: ANESTHESIOLOGY

## 2022-09-23 PROCEDURE — 87070 CULTURE OTHR SPECIMN AEROBIC: CPT | Performed by: ORTHOPAEDIC SURGERY

## 2022-09-23 PROCEDURE — 87186 SC STD MICRODIL/AGAR DIL: CPT | Performed by: ORTHOPAEDIC SURGERY

## 2022-09-23 PROCEDURE — 87077 CULTURE AEROBIC IDENTIFY: CPT | Performed by: ORTHOPAEDIC SURGERY

## 2022-09-23 PROCEDURE — 82962 GLUCOSE BLOOD TEST: CPT

## 2022-09-23 PROCEDURE — 25010000002 HYDROMORPHONE PER 4 MG: Performed by: ANESTHESIOLOGY

## 2022-09-23 PROCEDURE — 25010000002 PROPOFOL 10 MG/ML EMULSION: Performed by: ANESTHESIOLOGY

## 2022-09-23 PROCEDURE — 25010000002 FENTANYL CITRATE (PF) 50 MCG/ML SOLUTION: Performed by: ANESTHESIOLOGY

## 2022-09-23 PROCEDURE — 25010000002 CEFAZOLIN IN DEXTROSE 2-4 GM/100ML-% SOLUTION: Performed by: ORTHOPAEDIC SURGERY

## 2022-09-23 PROCEDURE — 25010000002 DEXAMETHASONE SODIUM PHOSPHATE 20 MG/5ML SOLUTION: Performed by: ANESTHESIOLOGY

## 2022-09-23 PROCEDURE — 25010000002 FENTANYL CITRATE (PF) 100 MCG/2ML SOLUTION: Performed by: ANESTHESIOLOGY

## 2022-09-23 PROCEDURE — 25010000002 ONDANSETRON PER 1 MG: Performed by: ANESTHESIOLOGY

## 2022-09-23 RX ORDER — OXYCODONE AND ACETAMINOPHEN 7.5; 325 MG/1; MG/1
1 TABLET ORAL EVERY 4 HOURS PRN
Status: DISCONTINUED | OUTPATIENT
Start: 2022-09-23 | End: 2022-09-23 | Stop reason: HOSPADM

## 2022-09-23 RX ORDER — PROMETHAZINE HYDROCHLORIDE 25 MG/1
25 SUPPOSITORY RECTAL ONCE AS NEEDED
Status: DISCONTINUED | OUTPATIENT
Start: 2022-09-23 | End: 2022-09-23 | Stop reason: HOSPADM

## 2022-09-23 RX ORDER — PROMETHAZINE HYDROCHLORIDE 25 MG/1
25 TABLET ORAL ONCE AS NEEDED
Status: DISCONTINUED | OUTPATIENT
Start: 2022-09-23 | End: 2022-09-23 | Stop reason: HOSPADM

## 2022-09-23 RX ORDER — HYDROCODONE BITARTRATE AND ACETAMINOPHEN 7.5; 325 MG/1; MG/1
1 TABLET ORAL ONCE AS NEEDED
Status: COMPLETED | OUTPATIENT
Start: 2022-09-23 | End: 2022-09-23

## 2022-09-23 RX ORDER — HYDROCODONE BITARTRATE AND ACETAMINOPHEN 5; 325 MG/1; MG/1
1-2 TABLET ORAL EVERY 4 HOURS PRN
Qty: 40 TABLET | Refills: 0 | Status: SHIPPED | OUTPATIENT
Start: 2022-09-23

## 2022-09-23 RX ORDER — HYDROMORPHONE HYDROCHLORIDE 1 MG/ML
0.5 INJECTION, SOLUTION INTRAMUSCULAR; INTRAVENOUS; SUBCUTANEOUS
Status: DISCONTINUED | OUTPATIENT
Start: 2022-09-23 | End: 2022-09-23 | Stop reason: HOSPADM

## 2022-09-23 RX ORDER — LIDOCAINE HYDROCHLORIDE 10 MG/ML
0.5 INJECTION, SOLUTION EPIDURAL; INFILTRATION; INTRACAUDAL; PERINEURAL ONCE AS NEEDED
Status: DISCONTINUED | OUTPATIENT
Start: 2022-09-23 | End: 2022-09-23 | Stop reason: HOSPADM

## 2022-09-23 RX ORDER — FLUMAZENIL 0.1 MG/ML
0.2 INJECTION INTRAVENOUS AS NEEDED
Status: DISCONTINUED | OUTPATIENT
Start: 2022-09-23 | End: 2022-09-23 | Stop reason: HOSPADM

## 2022-09-23 RX ORDER — FENTANYL CITRATE 50 UG/ML
INJECTION, SOLUTION INTRAMUSCULAR; INTRAVENOUS AS NEEDED
Status: DISCONTINUED | OUTPATIENT
Start: 2022-09-23 | End: 2022-09-23 | Stop reason: SURG

## 2022-09-23 RX ORDER — DEXAMETHASONE SODIUM PHOSPHATE 4 MG/ML
INJECTION, SOLUTION INTRA-ARTICULAR; INTRALESIONAL; INTRAMUSCULAR; INTRAVENOUS; SOFT TISSUE AS NEEDED
Status: DISCONTINUED | OUTPATIENT
Start: 2022-09-23 | End: 2022-09-23 | Stop reason: SURG

## 2022-09-23 RX ORDER — SODIUM CHLORIDE, SODIUM LACTATE, POTASSIUM CHLORIDE, CALCIUM CHLORIDE 600; 310; 30; 20 MG/100ML; MG/100ML; MG/100ML; MG/100ML
9 INJECTION, SOLUTION INTRAVENOUS CONTINUOUS
Status: DISCONTINUED | OUTPATIENT
Start: 2022-09-23 | End: 2022-09-23 | Stop reason: HOSPADM

## 2022-09-23 RX ORDER — SODIUM CHLORIDE 0.9 % (FLUSH) 0.9 %
3-10 SYRINGE (ML) INJECTION AS NEEDED
Status: DISCONTINUED | OUTPATIENT
Start: 2022-09-23 | End: 2022-09-23 | Stop reason: HOSPADM

## 2022-09-23 RX ORDER — HYDRALAZINE HYDROCHLORIDE 20 MG/ML
5 INJECTION INTRAMUSCULAR; INTRAVENOUS
Status: DISCONTINUED | OUTPATIENT
Start: 2022-09-23 | End: 2022-09-23 | Stop reason: HOSPADM

## 2022-09-23 RX ORDER — ACETAMINOPHEN 500 MG
500 TABLET ORAL ONCE
Status: COMPLETED | OUTPATIENT
Start: 2022-09-23 | End: 2022-09-23

## 2022-09-23 RX ORDER — PROPOFOL 10 MG/ML
VIAL (ML) INTRAVENOUS AS NEEDED
Status: DISCONTINUED | OUTPATIENT
Start: 2022-09-23 | End: 2022-09-23 | Stop reason: SURG

## 2022-09-23 RX ORDER — ONDANSETRON 2 MG/ML
INJECTION INTRAMUSCULAR; INTRAVENOUS AS NEEDED
Status: DISCONTINUED | OUTPATIENT
Start: 2022-09-23 | End: 2022-09-23 | Stop reason: SURG

## 2022-09-23 RX ORDER — SODIUM CHLORIDE 0.9 % (FLUSH) 0.9 %
10 SYRINGE (ML) INJECTION AS NEEDED
Status: DISCONTINUED | OUTPATIENT
Start: 2022-09-23 | End: 2022-09-23 | Stop reason: HOSPADM

## 2022-09-23 RX ORDER — MIDAZOLAM HYDROCHLORIDE 1 MG/ML
1 INJECTION INTRAMUSCULAR; INTRAVENOUS
Status: DISCONTINUED | OUTPATIENT
Start: 2022-09-23 | End: 2022-09-23 | Stop reason: HOSPADM

## 2022-09-23 RX ORDER — DIPHENHYDRAMINE HCL 25 MG
25 CAPSULE ORAL
Status: DISCONTINUED | OUTPATIENT
Start: 2022-09-23 | End: 2022-09-23 | Stop reason: HOSPADM

## 2022-09-23 RX ORDER — NALOXONE HCL 0.4 MG/ML
0.2 VIAL (ML) INJECTION AS NEEDED
Status: DISCONTINUED | OUTPATIENT
Start: 2022-09-23 | End: 2022-09-23 | Stop reason: HOSPADM

## 2022-09-23 RX ORDER — FENTANYL CITRATE 50 UG/ML
50 INJECTION, SOLUTION INTRAMUSCULAR; INTRAVENOUS
Status: DISCONTINUED | OUTPATIENT
Start: 2022-09-23 | End: 2022-09-23 | Stop reason: HOSPADM

## 2022-09-23 RX ORDER — SODIUM CHLORIDE 0.9 % (FLUSH) 0.9 %
3 SYRINGE (ML) INJECTION EVERY 12 HOURS SCHEDULED
Status: DISCONTINUED | OUTPATIENT
Start: 2022-09-23 | End: 2022-09-23 | Stop reason: HOSPADM

## 2022-09-23 RX ORDER — ONDANSETRON 2 MG/ML
4 INJECTION INTRAMUSCULAR; INTRAVENOUS ONCE AS NEEDED
Status: DISCONTINUED | OUTPATIENT
Start: 2022-09-23 | End: 2022-09-23 | Stop reason: HOSPADM

## 2022-09-23 RX ORDER — LABETALOL HYDROCHLORIDE 5 MG/ML
5 INJECTION, SOLUTION INTRAVENOUS
Status: DISCONTINUED | OUTPATIENT
Start: 2022-09-23 | End: 2022-09-23 | Stop reason: HOSPADM

## 2022-09-23 RX ORDER — KETOROLAC TROMETHAMINE 30 MG/ML
INJECTION, SOLUTION INTRAMUSCULAR; INTRAVENOUS AS NEEDED
Status: DISCONTINUED | OUTPATIENT
Start: 2022-09-23 | End: 2022-09-23 | Stop reason: SURG

## 2022-09-23 RX ORDER — DIPHENHYDRAMINE HYDROCHLORIDE 50 MG/ML
12.5 INJECTION INTRAMUSCULAR; INTRAVENOUS
Status: DISCONTINUED | OUTPATIENT
Start: 2022-09-23 | End: 2022-09-23 | Stop reason: HOSPADM

## 2022-09-23 RX ORDER — EPHEDRINE SULFATE 50 MG/ML
INJECTION INTRAVENOUS AS NEEDED
Status: DISCONTINUED | OUTPATIENT
Start: 2022-09-23 | End: 2022-09-23 | Stop reason: SURG

## 2022-09-23 RX ORDER — LIDOCAINE HYDROCHLORIDE 20 MG/ML
INJECTION, SOLUTION INFILTRATION; PERINEURAL AS NEEDED
Status: DISCONTINUED | OUTPATIENT
Start: 2022-09-23 | End: 2022-09-23 | Stop reason: SURG

## 2022-09-23 RX ORDER — CEFAZOLIN SODIUM 2 G/100ML
2 INJECTION, SOLUTION INTRAVENOUS ONCE
Status: COMPLETED | OUTPATIENT
Start: 2022-09-23 | End: 2022-09-23

## 2022-09-23 RX ORDER — EPHEDRINE SULFATE 50 MG/ML
5 INJECTION, SOLUTION INTRAVENOUS ONCE AS NEEDED
Status: DISCONTINUED | OUTPATIENT
Start: 2022-09-23 | End: 2022-09-23 | Stop reason: HOSPADM

## 2022-09-23 RX ADMIN — DEXAMETHASONE SODIUM PHOSPHATE 4 MG: 4 INJECTION, SOLUTION INTRAMUSCULAR; INTRAVENOUS at 15:31

## 2022-09-23 RX ADMIN — HYDROCODONE BITARTRATE AND ACETAMINOPHEN 1 TABLET: 7.5; 325 TABLET ORAL at 16:10

## 2022-09-23 RX ADMIN — HYDROMORPHONE HYDROCHLORIDE 0.5 MG: 1 INJECTION, SOLUTION INTRAMUSCULAR; INTRAVENOUS; SUBCUTANEOUS at 16:20

## 2022-09-23 RX ADMIN — SODIUM CHLORIDE, POTASSIUM CHLORIDE, SODIUM LACTATE AND CALCIUM CHLORIDE: 600; 310; 30; 20 INJECTION, SOLUTION INTRAVENOUS at 15:25

## 2022-09-23 RX ADMIN — EPHEDRINE SULFATE 10 MG: 50 INJECTION INTRAVENOUS at 15:34

## 2022-09-23 RX ADMIN — ACETAMINOPHEN 500 MG: 500 TABLET, FILM COATED ORAL at 13:15

## 2022-09-23 RX ADMIN — FENTANYL CITRATE 50 MCG: 50 INJECTION INTRAMUSCULAR; INTRAVENOUS at 16:07

## 2022-09-23 RX ADMIN — PROPOFOL 150 MG: 10 INJECTION, EMULSION INTRAVENOUS at 15:25

## 2022-09-23 RX ADMIN — LIDOCAINE HYDROCHLORIDE 60 MG: 20 INJECTION, SOLUTION INFILTRATION; PERINEURAL at 15:25

## 2022-09-23 RX ADMIN — CEFAZOLIN SODIUM 2 G: 2 INJECTION, SOLUTION INTRAVENOUS at 15:15

## 2022-09-23 RX ADMIN — ONDANSETRON 4 MG: 2 INJECTION INTRAMUSCULAR; INTRAVENOUS at 15:36

## 2022-09-23 RX ADMIN — FENTANYL CITRATE 50 MCG: 50 INJECTION, SOLUTION INTRAMUSCULAR; INTRAVENOUS at 15:24

## 2022-09-23 RX ADMIN — HYDROMORPHONE HYDROCHLORIDE 0.5 MG: 1 INJECTION, SOLUTION INTRAMUSCULAR; INTRAVENOUS; SUBCUTANEOUS at 16:11

## 2022-09-23 RX ADMIN — KETOROLAC TROMETHAMINE 30 MG: 30 INJECTION, SOLUTION INTRAMUSCULAR at 15:46

## 2022-09-23 RX ADMIN — FENTANYL CITRATE 50 MCG: 50 INJECTION INTRAMUSCULAR; INTRAVENOUS at 16:20

## 2022-09-23 NOTE — H&P
Patient Care Team:  Armond Madera MD as PCP - General (Internal Medicine)    CHIEF COMPLAINT wound dehiscense      Subjective   Patient is a 62 y.o. female presents with wound dehiscence from previous open tibial debridement prceure    Review of Systems   All systems were reviewed and negative except for:  Musculoskeletal: positive for  joint pain    History  Past Medical History:   Diagnosis Date   • Bony exostosis     right tibia..  from fall at [employer] Aug 2021   • Diabetes mellitus (HCC)     H/O... NON-COMPLIANT WITH MEDS   • Hypertension     H/O..  NON-COMPLIANT W/MEDS   • Kidney stone    • Pyelonephritis 2020    TREATED      Past Surgical History:   Procedure Laterality Date   • APPENDECTOMY     • BACK SURGERY     • CYSTOSCOPY W/ URETERAL STENT PLACEMENT Left 2020    Procedure: LT. CYSTO STENT;  Surgeon: Chemo Ramsey MD;  Location: [facility];  Service: Urology;  Laterality: Left;   • EXCISION MASS LEG Right 8/3/2022    Procedure: EXCISIONAL BIOPSY OF BONY EXOSTOSIS RIGHT TIBIA AND PATELLA TENDON REPAIR ;  Surgeon: Adair Cedillo MD;  Location: [facility];  Service: Orthopedics;  Laterality: Right;   • KNEE SURGERY     • URETEROSCOPY LASER LITHOTRIPSY WITH STENT INSERTION Left 2020    Procedure: URETEROSCOPY  LASER LITHOTRIPSY STONE BASKET EXTRACTION ON THE LEFT WITH STENT, CYSTOSCOPY;  Surgeon: Chemo Ramsey MD;  Location: [facility];  Service: Urology;  Laterality: Left;     Family History   Problem Relation Age of Onset   • Diabetes Mother    • Diabetes Father    • Heart disease Father    • COPD Father    • Malig Hyperthermia Neg Hx      Social History     Tobacco Use   • Smoking status: Former Smoker     Packs/day: 1.50     Years: 30.00     Pack years: 45.00     Types: Cigarettes     Quit date: 2008     Years since quittin.6   • Smokeless tobacco: Never Used   Substance Use Topics   • Alcohol use: Never   • Drug use: Yes     Types: Marijuana      Comment: occasionally for pain at bedtime       Current Facility-Administered Medications:   •  ceFAZolin in dextrose (ANCEF) IVPB solution 2 g, 2 g, Intravenous, Once, Adair Cedillo MD  •  lactated ringers infusion, 9 mL/hr, Intravenous, Continuous, Rubens Burris MD, Last Rate: 9 mL/hr at 09/23/22 1251, 9 mL/hr at 09/23/22 1251  •  lidocaine PF 1% (XYLOCAINE) injection 0.5 mL, 0.5 mL, Injection, Once PRN, Rubens Burris MD  •  midazolam (VERSED) injection 1 mg, 1 mg, Intravenous, Q10 Min PRN, Rubens Burris MD  •  sodium chloride 0.9 % flush 10 mL, 10 mL, Intravenous, PRN, Adair Cedillo MD  •  sodium chloride 0.9 % flush 3 mL, 3 mL, Intravenous, Q12H, Rubens Burris MD  •  sodium chloride 0.9 % flush 3-10 mL, 3-10 mL, Intravenous, PRN, Rubens Burris MD    Objective   Vital Signs  Temp:  [97.6 °F (36.4 °C)] 97.6 °F (36.4 °C)  Heart Rate:  [82] 82  Resp:  [16] 16  BP: (144)/(87) 144/87    Physical Exam:    General Appearance: alert, appears stated age and cooperative  Lungs: clear to auscultation, respirations regular, respirations even and respirations unlabored  Heart: regular rhythm & normal rate, normal S1, S2, no murmur, no gallop, no rub and no click    Results Review:    I reviewed the patient's new clinical results.    Assessment & Plan   Impression:  Wound dehiscence  Plan:  I and D and wound revision    * No active hospital problems. *          I discussed the patients findings and my recommendations with patient.     Adair Cedillo MD  09/23/22  13:58 EDT

## 2022-09-23 NOTE — ANESTHESIA POSTPROCEDURE EVALUATION
"Patient: Sherita Parada    Procedure Summary     Date: 09/23/22 Room / Location:  [facility] OR [address] [place] [address] [facility]    Anesthesia Start: 1518 Anesthesia Stop: 1603    Procedure: REMOVAL OF IMPLANTS, IRRIGATION AND DEBRIDMENT AND WOUND CLOSURE RIGHT LOWER EXTREMITY (Right ) Diagnosis:     Surgeons: Adair Cedillo MD Provider: Hodan Boyd MD    Anesthesia Type: general ASA Status: 2          Anesthesia Type: general    Vitals  Vitals Value Taken Time   /90 09/23/22 1645   Temp 36.3 °C (97.4 °F) 09/23/22 1630   Pulse 66 09/23/22 1645   Resp 15 09/23/22 1645   SpO2 94 % 09/23/22 1645           Post Anesthesia Care and Evaluation    Patient location during evaluation: bedside  Patient participation: complete - patient participated  Level of consciousness: awake  Pain management: adequate    Airway patency: patent  Anesthetic complications: No anesthetic complications  PONV Status: controlled  Cardiovascular status: acceptable  Respiratory status: acceptable  Hydration status: acceptable    Comments: /81 (BP Location: Right arm, Patient Position: Lying)   Pulse 64   Temp 36.3 °C (97.4 °F) (Oral)   Resp 15   Ht 160 cm (63\")   Wt 61.8 kg (136 lb 3.9 oz)   SpO2 96%   BMI 24.13 kg/m²         "

## 2022-09-23 NOTE — ANESTHESIA PREPROCEDURE EVALUATION
Anesthesia Evaluation     Patient summary reviewed and Nursing notes reviewed   no history of anesthetic complications:  NPO Solid Status: > 8 hours  NPO Liquid Status: > 2 hours           Airway   Mallampati: II  TM distance: >3 FB  Neck ROM: full  Dental      Pulmonary    Cardiovascular     (+) hypertension,       Neuro/Psych  GI/Hepatic/Renal/Endo    (+)   diabetes mellitus,     Musculoskeletal     (+) chronic pain,   Abdominal    Substance History      OB/GYN          Other                      Anesthesia Plan    ASA 2     general     intravenous induction     Anesthetic plan, risks, benefits, and alternatives have been provided, discussed and informed consent has been obtained with: patient.        CODE STATUS:

## 2022-09-23 NOTE — ANESTHESIA PROCEDURE NOTES
Airway  Urgency: elective    Date/Time: 9/23/2022 3:27 PM  Airway not difficult    General Information and Staff    Patient location during procedure: OR  Anesthesiologist: Hodan Boyd MD    Indications and Patient Condition  Indications for airway management: airway protection    Preoxygenated: yes  MILS maintained throughout  Mask difficulty assessment: 0 - not attempted    Final Airway Details  Final airway type: supraglottic airway      Successful airway: classic and LMA  Size 4    Number of attempts at approach: 1  Assessment: lips, teeth, and gum same as pre-op and atraumatic intubation

## 2022-09-23 NOTE — OP NOTE
INCISION AND DRAINAGE LOWER EXTREMITY  Procedure Note    Sherita Parada  9/23/2022    Pre-op Diagnosis:   1.  Right tibial wound infection with foreign body  2.   3.     Post-op Diagnosis:     1.  Same  2.   3.     Procedure(s):  1.  Irrigation debridement of right tibial wound  2.  Removal of foreign body  3.  Revision closure of incision  4.     Surgeon(s):  Adair Cedillo MD    Anesthesia: General  Anesthesiologist: Hodan Boyd MD    Staff:   Circulator: Mian Hall RN  Scrub Person: Carley Foster      Drains: None    Estimated Blood Loss: minimal    Specimen:   Order Name Source Comment Collection Info Order Time   ANAEROBIC CULTURE Knee, Right  Collected By: Adair Cedillo MD 9/23/2022  3:39 PM     Release to patient   Routine Release        WOUND CULTURE Knee, Right  Collected By: Adair Cedillo MD 9/23/2022  3:39 PM     Release to patient   Routine Release            Description of Procedure: Following duction of anesthesia the patient had a tourniquet placed on the right upper thigh.  The right leg were then prepped and draped in a sterile fashion.  While the limb was held elevated the tourniquet was inflated 250 mmHg pressure.  I first used a rongeur to grasp of the visible suture at the base of the patient's wound dehiscence.  I used a 15 blade to incise the suture loop which allow me to remove 2 of the sutures.  I then used a combination of the rongeur and 15 blade to remove some of the atrophic appearing tissue around the incision.  A second, new 15 blade was then used to freshen up the skin edges removing a millimeter or 2 of skin around the area of dehiscence which was around 2 cm in length.  I then irrigated first using a combination of Betadine and hydroperoxide which I let sit in the wound for a minute.  I then irrigated with saline.  I then performed a revision closure using 2-0 Prolene in interrupted fashion.  We achieved good apposition of the freshened up skin edges.  We then  placed her into a soft sterile dressing and released the tourniquet.  She was discharged to recovery in good condition.  Her prognosis is good.    Findings: See Dictation    Complications: None      Adair Cedillo MD     Date: 9/23/2022  Time: 15:52 EDT

## 2022-09-26 LAB
BACTERIA SPEC AEROBE CULT: ABNORMAL
BACTERIA SPEC AEROBE CULT: ABNORMAL
GRAM STN SPEC: ABNORMAL
GRAM STN SPEC: ABNORMAL

## 2022-09-28 LAB — BACTERIA SPEC ANAEROBE CULT: NORMAL

## (undated) DEVICE — BNDG ELAS ELITE V/CLOSE 6IN 5YD LF STRL

## (undated) DEVICE — TRAP FLD MINIVAC MEGADYNE 100ML

## (undated) DEVICE — GLV SURG BIOGEL LTX PF 8

## (undated) DEVICE — UNDERCAST PADDING: Brand: DEROYAL

## (undated) DEVICE — INTENDED FOR TISSUE SEPARATION, AND OTHER PROCEDURES THAT REQUIRE A SHARP SURGICAL BLADE TO PUNCTURE OR CUT.: Brand: BARD-PARKER ® CARBON RIB-BACK BLADES

## (undated) DEVICE — PENROSE DRAIN 18" X 5/8": Brand: CARDINAL HEALTH

## (undated) DEVICE — ANTIBACTERIAL UNDYED BRAIDED (POLYGLACTIN 910), SYNTHETIC ABSORBABLE SUTURE: Brand: COATED VICRYL

## (undated) DEVICE — HANDPIECE SET WITH COAXIAL HIGH FLOW TIP AND SUCTION TUBE: Brand: INTERPULSE

## (undated) DEVICE — GLV SURG SIGNATURE ESSENTIAL PF LTX SZ8

## (undated) DEVICE — SKIN PREP TRAY W/CHG: Brand: MEDLINE INDUSTRIES, INC.

## (undated) DEVICE — LOU CYSTO: Brand: MEDLINE INDUSTRIES, INC.

## (undated) DEVICE — EXTRCT STN NCIRCLE TIPLSS 2.2F1X115CM

## (undated) DEVICE — PK ORTHO MINOR TOWER 40

## (undated) DEVICE — TIDISHIELD UROLOGY DRAIN BAGS FROSTY VINYL STERILE FITS SIEMENS UROSKOP ACCESS 20 PER CASE: Brand: TIDISHIELD

## (undated) DEVICE — DRSNG GZ PETROLTM XEROFORM CURAD 1X8IN STRL

## (undated) DEVICE — PAD,ABDOMINAL,8"X10",ST,LF: Brand: MEDLINE

## (undated) DEVICE — PK URETSCP 40

## (undated) DEVICE — TBG PENCL TELESCP MEGADYNE SMOKE EVAC 10FT

## (undated) DEVICE — DRAPE,U/ SHT,SPLIT,PLAS,STERIL: Brand: MEDLINE

## (undated) DEVICE — DISPOSABLE TOURNIQUET CUFF SINGLE BLADDER, SINGLE PORT AND QUICK CONNECT CONNECTOR: Brand: COLOR CUFF

## (undated) DEVICE — NITINOL WIRE WITH HYDROPHILIC TIP: Brand: SENSOR

## (undated) DEVICE — STPLR SKIN VISISTAT WD 35CT

## (undated) DEVICE — FIBR LASR HOLMIUM ACCUMAX 200 1PT USE

## (undated) DEVICE — SYR LUERLOK 20CC BX/50

## (undated) DEVICE — GOWN,NON-REINFORCED,SIRUS,SET IN SLV,XL: Brand: MEDLINE

## (undated) DEVICE — PRT BIOP SEALS

## (undated) DEVICE — BNDG ELAS ELITE V/CLOSE 4IN 5YD LF STRL

## (undated) DEVICE — STCKNT IMPERV 12IN STRL